# Patient Record
Sex: MALE | Race: WHITE | Employment: OTHER | URBAN - METROPOLITAN AREA
[De-identification: names, ages, dates, MRNs, and addresses within clinical notes are randomized per-mention and may not be internally consistent; named-entity substitution may affect disease eponyms.]

---

## 2017-02-17 ENCOUNTER — OFFICE VISIT (OUTPATIENT)
Dept: FAMILY MEDICINE CLINIC | Age: 82
End: 2017-02-17

## 2017-02-17 VITALS
DIASTOLIC BLOOD PRESSURE: 74 MMHG | RESPIRATION RATE: 20 BRPM | HEIGHT: 72 IN | HEART RATE: 104 BPM | WEIGHT: 163.8 LBS | TEMPERATURE: 98.3 F | BODY MASS INDEX: 22.19 KG/M2 | SYSTOLIC BLOOD PRESSURE: 124 MMHG

## 2017-02-17 DIAGNOSIS — J06.9 URI, ACUTE: Primary | ICD-10-CM

## 2017-02-17 PROCEDURE — G8427 DOCREV CUR MEDS BY ELIG CLIN: HCPCS | Performed by: FAMILY MEDICINE

## 2017-02-17 PROCEDURE — 4040F PNEUMOC VAC/ADMIN/RCVD: CPT | Performed by: FAMILY MEDICINE

## 2017-02-17 PROCEDURE — 1036F TOBACCO NON-USER: CPT | Performed by: FAMILY MEDICINE

## 2017-02-17 PROCEDURE — 1123F ACP DISCUSS/DSCN MKR DOCD: CPT | Performed by: FAMILY MEDICINE

## 2017-02-17 PROCEDURE — G8419 CALC BMI OUT NRM PARAM NOF/U: HCPCS | Performed by: FAMILY MEDICINE

## 2017-02-17 PROCEDURE — 99213 OFFICE O/P EST LOW 20 MIN: CPT | Performed by: FAMILY MEDICINE

## 2017-02-17 PROCEDURE — G8484 FLU IMMUNIZE NO ADMIN: HCPCS | Performed by: FAMILY MEDICINE

## 2017-02-17 RX ORDER — HYDROCODONE POLISTIREX AND CHLORPHENIRAMINE POLISTIREX 10; 8 MG/5ML; MG/5ML
5 SUSPENSION, EXTENDED RELEASE ORAL EVERY 12 HOURS PRN
Qty: 115 ML | Refills: 0 | Status: SHIPPED | OUTPATIENT
Start: 2017-02-17 | End: 2018-02-12 | Stop reason: ALTCHOICE

## 2017-02-17 ASSESSMENT — PATIENT HEALTH QUESTIONNAIRE - PHQ9
SUM OF ALL RESPONSES TO PHQ QUESTIONS 1-9: 0
2. FEELING DOWN, DEPRESSED OR HOPELESS: 0
SUM OF ALL RESPONSES TO PHQ9 QUESTIONS 1 & 2: 0
1. LITTLE INTEREST OR PLEASURE IN DOING THINGS: 0

## 2017-02-20 ASSESSMENT — ENCOUNTER SYMPTOMS
WHEEZING: 0
SHORTNESS OF BREATH: 0
COUGH: 1
SINUS PRESSURE: 1
GASTROINTESTINAL NEGATIVE: 1

## 2017-04-04 ENCOUNTER — TELEPHONE (OUTPATIENT)
Dept: FAMILY MEDICINE CLINIC | Age: 82
End: 2017-04-04

## 2017-04-06 ENCOUNTER — OFFICE VISIT (OUTPATIENT)
Dept: FAMILY MEDICINE CLINIC | Age: 82
End: 2017-04-06

## 2017-04-06 VITALS
HEART RATE: 84 BPM | SYSTOLIC BLOOD PRESSURE: 142 MMHG | HEIGHT: 72 IN | WEIGHT: 161.6 LBS | DIASTOLIC BLOOD PRESSURE: 80 MMHG | RESPIRATION RATE: 16 BRPM | BODY MASS INDEX: 21.89 KG/M2

## 2017-04-06 DIAGNOSIS — S01.111A LACERATION OF EYEBROW, RIGHT, INITIAL ENCOUNTER: Primary | ICD-10-CM

## 2017-04-06 DIAGNOSIS — S05.11XA EYE CONTUSION, RIGHT, INITIAL ENCOUNTER: ICD-10-CM

## 2017-04-06 PROCEDURE — 1036F TOBACCO NON-USER: CPT | Performed by: FAMILY MEDICINE

## 2017-04-06 PROCEDURE — 99213 OFFICE O/P EST LOW 20 MIN: CPT | Performed by: FAMILY MEDICINE

## 2017-04-06 PROCEDURE — 4040F PNEUMOC VAC/ADMIN/RCVD: CPT | Performed by: FAMILY MEDICINE

## 2017-04-06 PROCEDURE — G8427 DOCREV CUR MEDS BY ELIG CLIN: HCPCS | Performed by: FAMILY MEDICINE

## 2017-04-06 PROCEDURE — 1123F ACP DISCUSS/DSCN MKR DOCD: CPT | Performed by: FAMILY MEDICINE

## 2017-04-06 PROCEDURE — G8419 CALC BMI OUT NRM PARAM NOF/U: HCPCS | Performed by: FAMILY MEDICINE

## 2017-04-06 ASSESSMENT — ENCOUNTER SYMPTOMS
GASTROINTESTINAL NEGATIVE: 1
RESPIRATORY NEGATIVE: 1

## 2017-07-15 ENCOUNTER — HOSPITAL ENCOUNTER (OUTPATIENT)
Age: 82
Setting detail: SPECIMEN
Discharge: HOME | End: 2017-07-15
Payer: MEDICARE

## 2017-07-15 DIAGNOSIS — C61 PROSTATE CANCER (HCC): ICD-10-CM

## 2017-07-15 PROCEDURE — 36415 COLL VENOUS BLD VENIPUNCTURE: CPT

## 2017-07-15 PROCEDURE — 84153 ASSAY OF PSA TOTAL: CPT

## 2017-07-16 LAB — PROSTATE SPECIFIC ANTIGEN: < 0.02 NG/ML (ref 0–1)

## 2017-08-03 ENCOUNTER — HOSPITAL ENCOUNTER (OUTPATIENT)
Dept: RADIATION ONCOLOGY | Age: 82
Discharge: HOME | End: 2017-08-03
Payer: MEDICARE

## 2017-08-03 PROCEDURE — 99211 OFF/OP EST MAY X REQ PHY/QHP: CPT | Performed by: RADIOLOGY

## 2017-09-13 ENCOUNTER — OFFICE VISIT (OUTPATIENT)
Dept: FAMILY MEDICINE CLINIC | Age: 82
End: 2017-09-13
Payer: MEDICARE

## 2017-09-13 VITALS
WEIGHT: 155.6 LBS | DIASTOLIC BLOOD PRESSURE: 68 MMHG | BODY MASS INDEX: 23.05 KG/M2 | HEART RATE: 56 BPM | HEIGHT: 69 IN | SYSTOLIC BLOOD PRESSURE: 122 MMHG | OXYGEN SATURATION: 93 % | RESPIRATION RATE: 12 BRPM

## 2017-09-13 DIAGNOSIS — Z85.46 HISTORY OF PROSTATE CANCER: ICD-10-CM

## 2017-09-13 DIAGNOSIS — R73.01 IFG (IMPAIRED FASTING GLUCOSE): ICD-10-CM

## 2017-09-13 DIAGNOSIS — E78.5 HYPERLIPIDEMIA WITH TARGET LDL LESS THAN 100: ICD-10-CM

## 2017-09-13 DIAGNOSIS — Z23 NEED FOR INFLUENZA VACCINATION: ICD-10-CM

## 2017-09-13 DIAGNOSIS — I10 ESSENTIAL HYPERTENSION: Primary | ICD-10-CM

## 2017-09-13 DIAGNOSIS — Z90.79 S/P PROSTATECTOMY: ICD-10-CM

## 2017-09-13 PROCEDURE — G8427 DOCREV CUR MEDS BY ELIG CLIN: HCPCS | Performed by: FAMILY MEDICINE

## 2017-09-13 PROCEDURE — 90688 IIV4 VACCINE SPLT 0.5 ML IM: CPT | Performed by: FAMILY MEDICINE

## 2017-09-13 PROCEDURE — 1123F ACP DISCUSS/DSCN MKR DOCD: CPT | Performed by: FAMILY MEDICINE

## 2017-09-13 PROCEDURE — 4040F PNEUMOC VAC/ADMIN/RCVD: CPT | Performed by: FAMILY MEDICINE

## 2017-09-13 PROCEDURE — 1036F TOBACCO NON-USER: CPT | Performed by: FAMILY MEDICINE

## 2017-09-13 PROCEDURE — 99214 OFFICE O/P EST MOD 30 MIN: CPT | Performed by: FAMILY MEDICINE

## 2017-09-13 PROCEDURE — G0008 ADMIN INFLUENZA VIRUS VAC: HCPCS | Performed by: FAMILY MEDICINE

## 2017-09-13 PROCEDURE — G8420 CALC BMI NORM PARAMETERS: HCPCS | Performed by: FAMILY MEDICINE

## 2017-09-13 ASSESSMENT — ENCOUNTER SYMPTOMS
GASTROINTESTINAL NEGATIVE: 1
RESPIRATORY NEGATIVE: 1

## 2017-11-03 ENCOUNTER — HOSPITAL ENCOUNTER (OUTPATIENT)
Age: 82
Discharge: HOME | End: 2017-11-03
Payer: MEDICARE

## 2017-11-03 DIAGNOSIS — C61 PROSTATE CANCER (HCC): ICD-10-CM

## 2017-11-03 DIAGNOSIS — R73.01 IFG (IMPAIRED FASTING GLUCOSE): ICD-10-CM

## 2017-11-03 DIAGNOSIS — E78.5 HYPERLIPIDEMIA WITH TARGET LDL LESS THAN 100: ICD-10-CM

## 2017-11-03 DIAGNOSIS — I10 ESSENTIAL HYPERTENSION: ICD-10-CM

## 2017-11-03 LAB
ALBUMIN SERPL-MCNC: 4.3 G/DL (ref 3.5–5.1)
ALBUMIN SERPL-MCNC: 4.3 G/DL (ref 3.5–5.1)
ALP BLD-CCNC: 63 U/L (ref 38–126)
ALP BLD-CCNC: 63 U/L (ref 38–126)
ALT SERPL-CCNC: 13 U/L (ref 11–66)
ALT SERPL-CCNC: 14 U/L (ref 11–66)
ANION GAP SERPL CALCULATED.3IONS-SCNC: 8 MEQ/L (ref 8–16)
AST SERPL-CCNC: 14 U/L (ref 5–40)
AST SERPL-CCNC: 15 U/L (ref 5–40)
AVERAGE GLUCOSE: 102 MG/DL (ref 70–126)
BASOPHILS # BLD: 0.5 %
BASOPHILS ABSOLUTE: 0 THOU/MM3 (ref 0–0.1)
BILIRUB SERPL-MCNC: 0.6 MG/DL (ref 0.3–1.2)
BILIRUB SERPL-MCNC: 0.6 MG/DL (ref 0.3–1.2)
BILIRUBIN DIRECT: < 0.2 MG/DL (ref 0–0.3)
BUN BLDV-MCNC: 16 MG/DL (ref 7–22)
CALCIUM SERPL-MCNC: 9.1 MG/DL (ref 8.5–10.5)
CHLORIDE BLD-SCNC: 102 MEQ/L (ref 98–111)
CHOLESTEROL, TOTAL: 166 MG/DL (ref 100–199)
CO2: 29 MEQ/L (ref 23–33)
CREAT SERPL-MCNC: 0.6 MG/DL (ref 0.4–1.2)
EOSINOPHIL # BLD: 6.5 %
EOSINOPHILS ABSOLUTE: 0.5 THOU/MM3 (ref 0–0.4)
GFR SERPL CREATININE-BSD FRML MDRD: > 90 ML/MIN/1.73M2
GLUCOSE BLD-MCNC: 100 MG/DL (ref 70–108)
HBA1C MFR BLD: 5.4 % (ref 4.4–6.4)
HCT VFR BLD CALC: 45.7 % (ref 42–52)
HDLC SERPL-MCNC: 55 MG/DL
HEMOGLOBIN: 15.4 GM/DL (ref 14–18)
LDL CHOLESTEROL CALCULATED: 92 MG/DL
LYMPHOCYTES # BLD: 14 %
LYMPHOCYTES ABSOLUTE: 1 THOU/MM3 (ref 1–4.8)
MCH RBC QN AUTO: 30 PG (ref 27–31)
MCHC RBC AUTO-ENTMCNC: 33.7 GM/DL (ref 33–37)
MCV RBC AUTO: 88.8 FL (ref 80–94)
MONOCYTES # BLD: 10.1 %
MONOCYTES ABSOLUTE: 0.7 THOU/MM3 (ref 0.4–1.3)
NUCLEATED RED BLOOD CELLS: 0 /100 WBC
PDW BLD-RTO: 13.7 % (ref 11.5–14.5)
PLATELET # BLD: 182 THOU/MM3 (ref 130–400)
PMV BLD AUTO: 9.1 MCM (ref 7.4–10.4)
POTASSIUM SERPL-SCNC: 4.7 MEQ/L (ref 3.5–5.2)
PROSTATE SPECIFIC ANTIGEN: < 0.02 NG/ML (ref 0–1)
RBC # BLD: 5.14 MILL/MM3 (ref 4.7–6.1)
SEG NEUTROPHILS: 68.9 %
SEGMENTED NEUTROPHILS ABSOLUTE COUNT: 4.9 THOU/MM3 (ref 1.8–7.7)
SODIUM BLD-SCNC: 139 MEQ/L (ref 135–145)
TOTAL PROTEIN: 6.7 G/DL (ref 6.1–8)
TOTAL PROTEIN: 6.8 G/DL (ref 6.1–8)
TRIGL SERPL-MCNC: 95 MG/DL (ref 0–199)
WBC # BLD: 7.1 THOU/MM3 (ref 4.8–10.8)

## 2017-11-03 PROCEDURE — 85025 COMPLETE CBC W/AUTO DIFF WBC: CPT

## 2017-11-03 PROCEDURE — 80061 LIPID PANEL: CPT

## 2017-11-03 PROCEDURE — 84153 ASSAY OF PSA TOTAL: CPT

## 2017-11-03 PROCEDURE — 83036 HEMOGLOBIN GLYCOSYLATED A1C: CPT

## 2017-11-03 PROCEDURE — 36415 COLL VENOUS BLD VENIPUNCTURE: CPT

## 2017-11-03 PROCEDURE — 80053 COMPREHEN METABOLIC PANEL: CPT

## 2017-11-03 PROCEDURE — 80076 HEPATIC FUNCTION PANEL: CPT

## 2017-11-06 ENCOUNTER — OFFICE VISIT (OUTPATIENT)
Dept: UROLOGY | Age: 82
End: 2017-11-06
Payer: MEDICARE

## 2017-11-06 VITALS
DIASTOLIC BLOOD PRESSURE: 80 MMHG | BODY MASS INDEX: 22.48 KG/M2 | HEIGHT: 71 IN | SYSTOLIC BLOOD PRESSURE: 130 MMHG | WEIGHT: 160.6 LBS

## 2017-11-06 DIAGNOSIS — C61 PROSTATE CANCER (HCC): Primary | ICD-10-CM

## 2017-11-06 LAB
BILIRUBIN URINE: NEGATIVE
BLOOD URINE, POC: NORMAL
CHARACTER, URINE: CLEAR
COLOR, URINE: YELLOW
GLUCOSE URINE: NEGATIVE MG/DL
KETONES, URINE: NEGATIVE
LEUKOCYTE CLUMPS, URINE: NEGATIVE
NITRITE, URINE: NEGATIVE
PH, URINE: 6.5
PROTEIN, URINE: NEGATIVE MG/DL
SPECIFIC GRAVITY, URINE: 1.01 (ref 1–1.03)
UROBILINOGEN, URINE: 0.2 EU/DL

## 2017-11-06 PROCEDURE — 4040F PNEUMOC VAC/ADMIN/RCVD: CPT | Performed by: UROLOGY

## 2017-11-06 PROCEDURE — 81003 URINALYSIS AUTO W/O SCOPE: CPT | Performed by: UROLOGY

## 2017-11-06 PROCEDURE — G8428 CUR MEDS NOT DOCUMENT: HCPCS | Performed by: UROLOGY

## 2017-11-06 PROCEDURE — 1123F ACP DISCUSS/DSCN MKR DOCD: CPT | Performed by: UROLOGY

## 2017-11-06 PROCEDURE — G8420 CALC BMI NORM PARAMETERS: HCPCS | Performed by: UROLOGY

## 2017-11-06 PROCEDURE — G8484 FLU IMMUNIZE NO ADMIN: HCPCS | Performed by: UROLOGY

## 2017-11-06 PROCEDURE — 1036F TOBACCO NON-USER: CPT | Performed by: UROLOGY

## 2017-11-06 PROCEDURE — 99214 OFFICE O/P EST MOD 30 MIN: CPT | Performed by: UROLOGY

## 2017-11-06 ASSESSMENT — ENCOUNTER SYMPTOMS
COLOR CHANGE: 0
NAUSEA: 0
ABDOMINAL PAIN: 0
FACIAL SWELLING: 0
EYE PAIN: 0
CHEST TIGHTNESS: 0
BACK PAIN: 0
EYE REDNESS: 0
SHORTNESS OF BREATH: 0

## 2017-11-06 NOTE — PROGRESS NOTES
m)   Wt 160 lb 9.6 oz (72.8 kg)   BMI 22.40 kg/m²     Objective:   Physical Exam   Constitutional: He is oriented to person, place, and time. Vital signs are normal. He appears well-developed and well-nourished. He is cooperative. No distress. HENT:   Head: Normocephalic and atraumatic. Mouth/Throat: Oropharynx is clear and moist and mucous membranes are normal. No oropharyngeal exudate. Eyes: EOM are normal. Pupils are equal, round, and reactive to light. Right eye exhibits no discharge. Left eye exhibits no discharge. No scleral icterus. Neck: Trachea normal. No JVD present. No tracheal deviation present. Pulmonary/Chest: Effort normal. No respiratory distress. He has no wheezes. Abdominal: Soft. He exhibits no distension. There is no tenderness. There is no rebound and no CVA tenderness. Musculoskeletal: He exhibits no edema or tenderness. Lymphadenopathy:        Right: No supraclavicular adenopathy present. Left: No supraclavicular adenopathy present. Neurological: He is alert and oriented to person, place, and time. No cranial nerve deficit. Skin: Skin is warm and dry. He is not diaphoretic. Psychiatric: He has a normal mood and affect. His behavior is normal.   Nursing note and vitals reviewed.       Labs    Results for POC orders placed in visit on 11/06/17   POCT Urinalysis No Micro (Auto)   Result Value Ref Range    Glucose, Ur Negative NEGATIVE mg/dl    Bilirubin Urine Negative     Ketones, Urine Negative NEGATIVE    Specific Gravity, Urine 1.010 1.002 - 1.03    Blood, UA POC Trace-intact NEGATIVE    pH, Urine 6.50 5.0 - 9.0    Protein, Urine Negative NEGATIVE mg/dl    Urobilinogen, Urine 0.20 0.0 - 1.0 eu/dl    Nitrite, Urine Negative NEGATIVE    Leukocyte Clumps, Urine Negative NEGATIVE    Color, Urine Yellow YELLOW-STR    Character, Urine Clear [id number]       Lab Results   Component Value Date    CREATININE 0.6 11/03/2017    BUN 16 11/03/2017     11/03/2017    K 4.7 11/03/2017     11/03/2017    CO2 29 11/03/2017       Lab Results   Component Value Date    PSA <0.02 11/03/2017    PSA <0.02 07/15/2017    PSA <0.02 01/19/2017           Assessment:      1. Prostate cancer ([facility]  POCT Urinalysis No Micro (Auto)       Mr. Daksha Dunham presents today in follow-up for Prostate cancer ([facility] Jemal Angelo. PSA remains undetectable at 0.02. He will continue getting this drawn every 6 months. He has no complaints with urination. Overall he is doing very well. He is very pleased with his progress. I have reviewed all notes sent along with this referral including notes from a recent visit to his primary care provider. These records demonstrated the following past medical history:  Past Medical History:   Diagnosis Date    Hyperlipidemia     Hypertension     Prostate cancer ([facility]     38 rounds of radiation for prostate Ca          Plan:        Return in a year -PSA Prior  Standing order for PSA given for every 6 months.

## 2018-01-11 ENCOUNTER — HOSPITAL ENCOUNTER (OUTPATIENT)
Age: 83
Discharge: HOME | End: 2018-01-11
Payer: MEDICARE

## 2018-01-11 DIAGNOSIS — C61 PROSTATE CANCER (HCC): ICD-10-CM

## 2018-01-11 LAB — PROSTATE SPECIFIC ANTIGEN: < 0.02 NG/ML (ref 0–1)

## 2018-01-11 PROCEDURE — 36415 COLL VENOUS BLD VENIPUNCTURE: CPT

## 2018-01-11 PROCEDURE — 84153 ASSAY OF PSA TOTAL: CPT

## 2018-01-17 ENCOUNTER — TELEPHONE (OUTPATIENT)
Dept: FAMILY MEDICINE CLINIC | Age: 83
End: 2018-01-17

## 2018-01-17 DIAGNOSIS — E78.5 HYPERLIPIDEMIA WITH TARGET LDL LESS THAN 100: ICD-10-CM

## 2018-01-17 DIAGNOSIS — I10 ESSENTIAL HYPERTENSION: ICD-10-CM

## 2018-01-17 RX ORDER — AMLODIPINE BESYLATE 5 MG/1
5 TABLET ORAL DAILY
Qty: 30 TABLET | Refills: 0 | Status: SHIPPED | OUTPATIENT
Start: 2018-01-17 | End: 2018-03-10 | Stop reason: SDUPTHER

## 2018-01-17 RX ORDER — AMLODIPINE BESYLATE 5 MG/1
5 TABLET ORAL DAILY
Qty: 90 TABLET | Refills: 3 | Status: SHIPPED | OUTPATIENT
Start: 2018-01-17 | End: 2018-01-17 | Stop reason: SDUPTHER

## 2018-01-17 RX ORDER — BENAZEPRIL HYDROCHLORIDE 10 MG/1
10 TABLET ORAL DAILY
Qty: 30 TABLET | Refills: 0 | Status: SHIPPED | OUTPATIENT
Start: 2018-01-17 | End: 2018-03-10 | Stop reason: SDUPTHER

## 2018-01-17 RX ORDER — VALSARTAN 320 MG/1
320 TABLET ORAL DAILY
Qty: 90 TABLET | Refills: 3 | Status: SHIPPED | OUTPATIENT
Start: 2018-01-17 | End: 2018-01-17 | Stop reason: SDUPTHER

## 2018-01-17 RX ORDER — SIMVASTATIN 20 MG
TABLET ORAL
Qty: 30 TABLET | Refills: 0 | Status: SHIPPED | OUTPATIENT
Start: 2018-01-17 | End: 2018-03-10 | Stop reason: SDUPTHER

## 2018-01-17 RX ORDER — SIMVASTATIN 20 MG
TABLET ORAL
Qty: 90 TABLET | Refills: 3 | Status: SHIPPED | OUTPATIENT
Start: 2018-01-17 | End: 2018-01-17 | Stop reason: SDUPTHER

## 2018-01-17 RX ORDER — BENAZEPRIL HYDROCHLORIDE 10 MG/1
10 TABLET ORAL DAILY
Qty: 90 TABLET | Refills: 3 | Status: SHIPPED | OUTPATIENT
Start: 2018-01-17 | End: 2018-01-17 | Stop reason: SDUPTHER

## 2018-01-17 RX ORDER — VALSARTAN 320 MG/1
320 TABLET ORAL DAILY
Qty: 30 TABLET | Refills: 0 | Status: SHIPPED | OUTPATIENT
Start: 2018-01-17 | End: 2018-03-10 | Stop reason: SDUPTHER

## 2018-02-09 ENCOUNTER — HOSPITAL ENCOUNTER (OUTPATIENT)
Dept: RADIATION ONCOLOGY | Age: 83
Discharge: HOME | End: 2018-02-09
Payer: MEDICARE

## 2018-02-09 PROCEDURE — 99211 OFF/OP EST MAY X REQ PHY/QHP: CPT | Performed by: RADIOLOGY

## 2018-02-09 NOTE — ONCOLOGY
wheeze, rhonchi or rales   Heart: Regular rate and rhythm,   Abdomen: Active bowel sounds present, soft, non-tender;  no mass,  no organomegaly  /Rectal: External genitalia without abnormality, lesion or discharge. Patient is continent of urine at this time. There is no external hemorrhoidal tissue present. Digitally sphincter tone is normal prostatic fossa is \"empty\" remaining slightly concave without areas of suspicious induration or nodularity. Within the limits of this exam I do not appreciate an intrinsic rectal mass  Extremities: without cyanosis, clubbing, edema or asymmetry  Skin: No fresh rash, purpura or petechiae    LABS:     CBC:   Lab Results   Component Value Date    WBC 7.1 11/03/2017    HGB 15.4 11/03/2017    MCV 88.8 11/03/2017     11/03/2017       BMP:   Lab Results   Component Value Date     11/03/2017    K 4.7 11/03/2017    CO2 29 11/03/2017    BUN 16 11/03/2017    CREATININE 0.6 11/03/2017       HEPATIC:  Lab Results   Component Value Date    AST 15 11/03/2017    AST 14 11/03/2017    ALT 13 11/03/2017    ALT 14 11/03/2017    ALKPHOS 63 11/03/2017    ALKPHOS 63 11/03/2017    PROT 6.7 11/03/2017    PROT 6.8 11/03/2017    BILITOT 0.6 11/03/2017    BILITOT 0.6 11/03/2017    BILIDIR <0.2 11/03/2017       TUMOR MARKERS:   Lab Results   Component Value Date    PSA <0.02 01/11/2018       IMAGING:     No recent relevant studies for review. He fell on the ice recently and had x-rays but had no dislocations or fractures. ASSESSMENT AND PLAN:       Issa Rea is cNED without significant  treatment related sequela. They will be seen in a routine follow-up interval of 6  months. Issa Rea understands that they may and should make an earlier appointment should the need arise. Thank you for the opportunity to remain involved with sharing in [facility] ([id number]):  In excess of 25 minutes was spent with the patient discussing their medical history, treatment outcome and possible treatment-related side effects. Greater than 50% of this time was spent in face to face discussion of current disease status and ongoing management as it pertains to their individual survivorship plan. Signature:      Zaida Acosta. Paolo Nugent MD  Radiation Oncology  Signed electronically without final edit.

## 2018-02-12 ENCOUNTER — OFFICE VISIT (OUTPATIENT)
Dept: FAMILY MEDICINE CLINIC | Age: 83
End: 2018-02-12
Payer: MEDICARE

## 2018-02-12 VITALS
HEIGHT: 68 IN | SYSTOLIC BLOOD PRESSURE: 120 MMHG | BODY MASS INDEX: 24.16 KG/M2 | HEART RATE: 72 BPM | DIASTOLIC BLOOD PRESSURE: 72 MMHG | WEIGHT: 159.4 LBS | OXYGEN SATURATION: 97 %

## 2018-02-12 DIAGNOSIS — Z90.79 S/P PROSTATECTOMY: ICD-10-CM

## 2018-02-12 DIAGNOSIS — Z23 NEED FOR 23-POLYVALENT PNEUMOCOCCAL POLYSACCHARIDE VACCINE: ICD-10-CM

## 2018-02-12 DIAGNOSIS — I10 ESSENTIAL HYPERTENSION: Primary | ICD-10-CM

## 2018-02-12 DIAGNOSIS — Z00.00 ROUTINE GENERAL MEDICAL EXAMINATION AT A HEALTH CARE FACILITY: ICD-10-CM

## 2018-02-12 DIAGNOSIS — Z85.46 HISTORY OF PROSTATE CANCER: ICD-10-CM

## 2018-02-12 DIAGNOSIS — E78.5 HYPERLIPIDEMIA WITH TARGET LDL LESS THAN 100: ICD-10-CM

## 2018-02-12 PROCEDURE — 1036F TOBACCO NON-USER: CPT | Performed by: FAMILY MEDICINE

## 2018-02-12 PROCEDURE — G0009 ADMIN PNEUMOCOCCAL VACCINE: HCPCS | Performed by: FAMILY MEDICINE

## 2018-02-12 PROCEDURE — G8484 FLU IMMUNIZE NO ADMIN: HCPCS | Performed by: FAMILY MEDICINE

## 2018-02-12 PROCEDURE — 90732 PPSV23 VACC 2 YRS+ SUBQ/IM: CPT | Performed by: FAMILY MEDICINE

## 2018-02-12 PROCEDURE — 4040F PNEUMOC VAC/ADMIN/RCVD: CPT | Performed by: FAMILY MEDICINE

## 2018-02-12 PROCEDURE — 99214 OFFICE O/P EST MOD 30 MIN: CPT | Performed by: FAMILY MEDICINE

## 2018-02-12 PROCEDURE — 1123F ACP DISCUSS/DSCN MKR DOCD: CPT | Performed by: FAMILY MEDICINE

## 2018-02-12 PROCEDURE — G0439 PPPS, SUBSEQ VISIT: HCPCS | Performed by: FAMILY MEDICINE

## 2018-02-12 PROCEDURE — G8420 CALC BMI NORM PARAMETERS: HCPCS | Performed by: FAMILY MEDICINE

## 2018-02-12 PROCEDURE — G8427 DOCREV CUR MEDS BY ELIG CLIN: HCPCS | Performed by: FAMILY MEDICINE

## 2018-02-12 ASSESSMENT — LIFESTYLE VARIABLES: HOW OFTEN DO YOU HAVE A DRINK CONTAINING ALCOHOL: 0

## 2018-02-12 ASSESSMENT — ENCOUNTER SYMPTOMS
GASTROINTESTINAL NEGATIVE: 1
RESPIRATORY NEGATIVE: 1

## 2018-02-12 ASSESSMENT — ANXIETY QUESTIONNAIRES: GAD7 TOTAL SCORE: 0

## 2018-02-12 ASSESSMENT — PATIENT HEALTH QUESTIONNAIRE - PHQ9: SUM OF ALL RESPONSES TO PHQ QUESTIONS 1-9: 0

## 2018-02-12 NOTE — PROGRESS NOTES
Patient was given Pneumovax 23 IM in left deltoid per v.o. Dr Luz Diaz, given by Jeri Holman CMA ([facility]). [address] #[id number]. Patient tolerated well and without incident.

## 2018-02-12 NOTE — PROGRESS NOTES
Visit Information    Have you changed or started any medications since your last visit including any over-the-counter medicines, vitamins, or herbal medicines? no   Are you having any side effects from any of your medications? -  no  Have you stopped taking any of your medications? Is so, why? -  no    Have you seen any other physician or provider since your last visit? Yes - Records Obtained  Have you had any other diagnostic tests since your last visit? Yes - Records Obtained  Have you been seen in the emergency room and/or had an admission to a hospital since we last saw you? No  Have you had your routine dental cleaning in the past 6 months? yes -     Have you activated your [employer] account? If not, what are your barriers?  No:      Patient Care Team:  Juan M Garcia DO as PCP - General (Family Medicine)  Juan M Garcia DO as PCP - S Attributed Provider  Celio Lara MD as Consulting Physician (Urology)    Medical History Review  Past Medical, Family, and Social History reviewed and does contribute to the patient presenting condition    Health Maintenance   Topic Date Due    Pneumococcal low/med risk (2 of 2 - PPSV23) 09/11/2016    DTaP/Tdap/Td vaccine (1 - Tdap) 03/30/2017    Potassium monitoring  11/03/2018    Creatinine monitoring  11/03/2018    Zostavax vaccine  Addressed    Flu vaccine  Completed

## 2018-02-12 NOTE — PROGRESS NOTES
Subjective:      Patient ID: Georgi Contreras is a 80 y.o. male. HPI:   Annual eval.    Doing well overall. Continues to follow with Dr. Angus Arenas on regular basis. Denies CP, SOB. BP well controlled. BP Readings from Last 3 Encounters:   02/12/18 120/72   11/06/17 130/80   09/13/17 122/68     Weight stable. Wt Readings from Last 3 Encounters:   02/12/18 159 lb 6.4 oz (72.3 kg)   11/06/17 160 lb 9.6 oz (72.8 kg)   09/13/17 155 lb 9.6 oz (70.6 kg)     Hx of prostate cancer, follows closely with Urology. No acute complaints today. Patient Active Problem List   Diagnosis    S/P colonoscopy    HTN (hypertension)    Hyperlipidemia with target LDL less than 100    History of prostate cancer    Elevated PSA    S/P prostatectomy     Past Surgical History:   Procedure Laterality Date    COLONOSCOPY  9-19-13    Dr. Adina Calixto Right 1990's     INGUINAL HERNIA REPAIR Left 1990's    [facility] Right 1's    [facility] in [place]' prostatectomy     Prior to Admission medications    Medication Sig Start Date End Date Taking? Authorizing Provider   simvastatin (ZOCOR) 20 MG tablet Take 1 tablet by mouth  nightly 1/17/18  Yes Sharon Maya, DO   amLODIPine (NORVASC) 5 MG tablet Take 1 tablet by mouth daily 1/17/18  Yes Sharon Maya, DO   benazepril (LOTENSIN) 10 MG tablet Take 1 tablet by mouth daily 1/17/18  Yes Sharon Maya DO   valsartan (DIOVAN) 320 MG tablet Take 1 tablet by mouth daily 1/17/18  Yes Sharon Maya, DO   ipratropium (ATROVENT) 0.03 % nasal spray 2 sprays by Nasal route every 12 hours 11/29/16  Yes Sharon Maya, DO   aspirin 81 MG tablet Take 81 mg by mouth daily. Yes Historical Provider, MD   Multiple Vitamins-Minerals (MULTIVITAMIN PO) Take 1 tablet by mouth daily. Yes Historical Provider, MD   b complex vitamins capsule Take 1 capsule by mouth daily.    Yes Historical Provider, MD   Ascorbic Acid (VITAMIN C) 500 MG tablet Take 1,000 mg by mouth daily. Yes Historical Provider, MD   oyster calcium (OYST-VEGA) 500 MG TABS Take 1 tablet by mouth daily. Yes Historical Provider, MD   Omega 3 1000 MG CAPS Take 1 tablet by mouth daily. Yes Historical Provider, MD   Vitamin D (CHOLECALCIFEROL) 1000 UNITS CAPS capsule Take 1,000 Units by mouth daily.    Yes Historical Provider, MD       Lab Results   Component Value Date    LABA1C 5.4 11/03/2017     No results found for: EAG    No components found for: CHLPL  Lab Results   Component Value Date    TRIG 95 11/03/2017    TRIG 83 06/03/2016    TRIG 104 03/03/2015     Lab Results   Component Value Date    HDL 55 11/03/2017    HDL 53 06/03/2016    HDL 53 03/03/2015     Lab Results   Component Value Date    LDLCALC 92 11/03/2017    LDLCALC 74 06/03/2016     No results found for: LABVLDL      Chemistry        Component Value Date/Time     11/03/2017 0732    K 4.7 11/03/2017 0732     11/03/2017 0732    CO2 29 11/03/2017 0732    BUN 16 11/03/2017 0732    CREATININE 0.6 11/03/2017 0732        Component Value Date/Time    CALCIUM 9.1 11/03/2017 0732    ALKPHOS 63 11/03/2017 0732    ALKPHOS 63 11/03/2017 0732    AST 15 11/03/2017 0732    AST 14 11/03/2017 0732    ALT 13 11/03/2017 0732    ALT 14 11/03/2017 0732    BILITOT 0.6 11/03/2017 0732    BILITOT 0.6 11/03/2017 0732            No results found for: TSH, Q7PXSZS, B5WMOWH, THYROIDAB    Lab Results   Component Value Date    WBC 7.1 11/03/2017    HGB 15.4 11/03/2017    HCT 45.7 11/03/2017    MCV 88.8 11/03/2017     11/03/2017         Health Maintenance   Topic Date Due    Pneumococcal low/med risk (2 of 2 - PPSV23) 09/11/2016    DTaP/Tdap/Td vaccine (1 - Tdap) 03/30/2017    Potassium monitoring  11/03/2018    Creatinine monitoring  11/03/2018    Zostavax vaccine  Addressed    Flu vaccine  Completed       Immunization History   Administered Date(s) Administered    Influenza Vaccine, unspecified formulation 11/23/2015    Influenza, Quadv, 3 Years and older, IM 11/29/2016, 09/13/2017    Pneumococcal 13-valent Conjugate ([id number]) 09/11/2015    Td 03/29/2017         Review of Systems   Constitutional: Negative. HENT: Negative. Respiratory: Negative. Cardiovascular: Negative. Gastrointestinal: Negative. Musculoskeletal: Negative. All other systems reviewed and are negative. Objective:   Physical Exam   Constitutional: He is oriented to person, place, and time. He appears well-developed and well-nourished. HENT:   Head: Normocephalic and atraumatic. Right Ear: Tympanic membrane normal.   Left Ear: Tympanic membrane normal.   Mouth/Throat: Oropharynx is clear and moist and mucous membranes are normal.   Cardiovascular: Normal rate, regular rhythm and normal heart sounds. No murmur heard. Pulmonary/Chest: Effort normal and breath sounds normal.   Abdominal: Soft. Bowel sounds are normal.   Musculoskeletal: He exhibits no edema. Neurological: He is alert and oriented to person, place, and time. Skin: Skin is warm and dry. Psychiatric: He has a normal mood and affect. His behavior is normal.   Nursing note and vitals reviewed. Assessment:      1. Essential hypertension     2. Hyperlipidemia with target LDL less than 100     3. History of prostate cancer     4. S/P prostatectomy     5. Need for 23-polyvalent pneumococcal polysaccharide vaccine  Pneumococcal polysaccharide vaccine 23-valent greater than or equal to 3yo subcutaneous/IM   6. Routine general medical examination at a health care facility             Plan:      -  Chronic medical problems stable  -  Continue current medications  -  Follow up with specialists as scheduled  -  Labs reviewed, look fine  -  Pneumovax-23 today  -  RTO annually          Medicare Annual Wellness Visit  Name: Jl Velazquez Date: 2/12/2018   MRN: [id number] Sex: Male   Age: 80 y.o.  Ethnicity: Date    COLONOSCOPY  9-19-13    Dr. Andria Rivera Right 1990's     INGUINAL HERNIA REPAIR Left 1's     INGUINAL HERNIA REPAIR Right 1's    [address] in [place]' prostatectomy       Family History   Problem Relation Age of Onset    Stroke Mother     Arthritis Mother     Heart Attack Father     Heart Disease Father        CareTeam (Including outside providers/suppliers regularly involved in providing care):   Patient Care Team:  Isabella Paiz DO as PCP - General (Family Medicine)  Isabella Paiz DO as PCP - [facility] Attributed Provider  Jeremy Gomes MD as Consulting Physician (Urology)    Wt Readings from Last 3 Encounters:   02/12/18 159 lb 6.4 oz (72.3 kg)   11/06/17 160 lb 9.6 oz (72.8 kg)   09/13/17 155 lb 9.6 oz (70.6 kg)     Vitals:    02/12/18 0756   BP: 120/72   Site: Left Arm   Position: Sitting   Cuff Size: Small Adult   Pulse: 72   SpO2: 97%   Weight: 159 lb 6.4 oz (72.3 kg)   Height: 5' 8\" (1.727 m)           Patient's complete Health Risk Assessment and screening values have been reviewed and are found in Flowsheets. The following problems were reviewed today and where indicated follow up appointments were made and/or referrals ordered.     Positive Risk Factor Screenings with Interventions:     Safety:  Safety  Do you have working smoke detectors?: Yes  Have all throw rugs been removed or fastened?: (!) No  Do you have non-slip mats in all bathtubs?: Yes  Do all of your stairways have a railing or banister?: Yes  Are your doorways, halls and stairs free of clutter?: Yes  Do you always fasten your seatbelt when you are in a car?: Yes  Safety Interventions:  · Home safety tips provided      Personalized Preventive Plan   Current Health Maintenance Status  Immunization History   Administered Date(s) Administered    Influenza Vaccine, unspecified formulation 11/23/2015    Influenza, Quadv, 3 Years and older, IM 11/29/2016, 09/13/2017    Pneumococcal 13-valent Conjugate ([id number]) 09/11/2015    Td 03/29/2017        Health Maintenance   Topic Date Due    Pneumococcal low/med risk (2 of 2 - PPSV23) 09/11/2016    DTaP/Tdap/Td vaccine (1 - Tdap) 03/30/2017    Potassium monitoring  11/03/2018    Creatinine monitoring  11/03/2018    Zostavax vaccine  Addressed    Flu vaccine  Completed     Recommendations for Preventive Services Due: see orders.   Recommended screening schedule for the next 5-10 years is provided to the patient in written form: see Patient Instructions/AVS.

## 2018-03-10 DIAGNOSIS — I10 ESSENTIAL HYPERTENSION: ICD-10-CM

## 2018-03-10 DIAGNOSIS — E78.5 HYPERLIPIDEMIA WITH TARGET LDL LESS THAN 100: ICD-10-CM

## 2018-03-12 RX ORDER — AMLODIPINE BESYLATE 5 MG/1
5 TABLET ORAL DAILY
Qty: 30 TABLET | Refills: 0 | Status: SHIPPED | OUTPATIENT
Start: 2018-03-12 | End: 2018-06-26 | Stop reason: SDUPTHER

## 2018-03-12 RX ORDER — VALSARTAN 320 MG/1
320 TABLET ORAL DAILY
Qty: 30 TABLET | Refills: 0 | Status: SHIPPED | OUTPATIENT
Start: 2018-03-12 | End: 2018-06-26 | Stop reason: SDUPTHER

## 2018-03-12 RX ORDER — BENAZEPRIL HYDROCHLORIDE 10 MG/1
10 TABLET ORAL DAILY
Qty: 30 TABLET | Refills: 0 | Status: SHIPPED | OUTPATIENT
Start: 2018-03-12 | End: 2018-06-26 | Stop reason: SDUPTHER

## 2018-03-12 RX ORDER — SIMVASTATIN 20 MG
TABLET ORAL
Qty: 30 TABLET | Refills: 0 | Status: SHIPPED | OUTPATIENT
Start: 2018-03-12 | End: 2018-06-26 | Stop reason: SDUPTHER

## 2018-05-02 ENCOUNTER — HOSPITAL ENCOUNTER (OUTPATIENT)
Age: 83
Discharge: HOME | End: 2018-05-02
Payer: MEDICARE

## 2018-05-02 DIAGNOSIS — C61 PROSTATE CANCER (HCC): ICD-10-CM

## 2018-05-02 LAB — PROSTATE SPECIFIC ANTIGEN: < 0.02 NG/ML (ref 0–1)

## 2018-05-02 PROCEDURE — 36415 COLL VENOUS BLD VENIPUNCTURE: CPT

## 2018-05-02 PROCEDURE — 84153 ASSAY OF PSA TOTAL: CPT

## 2018-06-26 DIAGNOSIS — E78.5 HYPERLIPIDEMIA WITH TARGET LDL LESS THAN 100: ICD-10-CM

## 2018-06-26 DIAGNOSIS — I10 ESSENTIAL HYPERTENSION: ICD-10-CM

## 2018-06-27 ENCOUNTER — TELEPHONE (OUTPATIENT)
Dept: FAMILY MEDICINE CLINIC | Age: 83
End: 2018-06-27

## 2018-06-27 DIAGNOSIS — E78.5 HYPERLIPIDEMIA WITH TARGET LDL LESS THAN 100: ICD-10-CM

## 2018-06-27 DIAGNOSIS — I10 ESSENTIAL HYPERTENSION: ICD-10-CM

## 2018-06-27 RX ORDER — BENAZEPRIL HYDROCHLORIDE 10 MG/1
10 TABLET ORAL DAILY
Qty: 60 TABLET | Refills: 0 | Status: SHIPPED | OUTPATIENT
Start: 2018-06-27 | End: 2018-06-27 | Stop reason: SDUPTHER

## 2018-06-27 RX ORDER — VALSARTAN 320 MG/1
320 TABLET ORAL DAILY
Qty: 30 TABLET | Refills: 0 | Status: SHIPPED | OUTPATIENT
Start: 2018-06-27 | End: 2018-06-27 | Stop reason: SDUPTHER

## 2018-06-27 RX ORDER — VALSARTAN 320 MG/1
320 TABLET ORAL DAILY
Qty: 60 TABLET | Refills: 0 | Status: SHIPPED | OUTPATIENT
Start: 2018-06-27 | End: 2018-07-25

## 2018-06-27 RX ORDER — AMLODIPINE BESYLATE 5 MG/1
5 TABLET ORAL DAILY
Qty: 30 TABLET | Refills: 0 | Status: SHIPPED | OUTPATIENT
Start: 2018-06-27 | End: 2018-06-27 | Stop reason: SDUPTHER

## 2018-06-27 RX ORDER — AMLODIPINE BESYLATE 5 MG/1
5 TABLET ORAL DAILY
Qty: 60 TABLET | Refills: 0 | Status: SHIPPED | OUTPATIENT
Start: 2018-06-27 | End: 2018-06-27 | Stop reason: SDUPTHER

## 2018-06-27 RX ORDER — VALSARTAN 320 MG/1
320 TABLET ORAL DAILY
Qty: 60 TABLET | Refills: 0 | Status: SHIPPED | OUTPATIENT
Start: 2018-06-27 | End: 2018-06-27 | Stop reason: SDUPTHER

## 2018-06-27 RX ORDER — SIMVASTATIN 20 MG
TABLET ORAL
Qty: 60 TABLET | Refills: 0 | Status: SHIPPED | OUTPATIENT
Start: 2018-06-27 | End: 2018-06-27 | Stop reason: SDUPTHER

## 2018-06-27 RX ORDER — BENAZEPRIL HYDROCHLORIDE 10 MG/1
10 TABLET ORAL DAILY
Qty: 60 TABLET | Refills: 0 | Status: SHIPPED | OUTPATIENT
Start: 2018-06-27 | End: 2018-09-13 | Stop reason: SDUPTHER

## 2018-06-27 RX ORDER — AMLODIPINE BESYLATE 5 MG/1
5 TABLET ORAL DAILY
Qty: 60 TABLET | Refills: 0 | Status: SHIPPED | OUTPATIENT
Start: 2018-06-27 | End: 2018-09-13 | Stop reason: SDUPTHER

## 2018-06-27 RX ORDER — SIMVASTATIN 20 MG
TABLET ORAL
Qty: 30 TABLET | Refills: 0 | Status: SHIPPED | OUTPATIENT
Start: 2018-06-27 | End: 2018-06-27 | Stop reason: SDUPTHER

## 2018-06-27 RX ORDER — SIMVASTATIN 20 MG
TABLET ORAL
Qty: 60 TABLET | Refills: 0 | Status: SHIPPED | OUTPATIENT
Start: 2018-06-27 | End: 2018-09-13 | Stop reason: SDUPTHER

## 2018-06-27 RX ORDER — BENAZEPRIL HYDROCHLORIDE 10 MG/1
10 TABLET ORAL DAILY
Qty: 30 TABLET | Refills: 0 | Status: SHIPPED | OUTPATIENT
Start: 2018-06-27 | End: 2018-06-27 | Stop reason: SDUPTHER

## 2018-07-25 ENCOUNTER — TELEPHONE (OUTPATIENT)
Dept: FAMILY MEDICINE CLINIC | Age: 83
End: 2018-07-25

## 2018-07-25 RX ORDER — IRBESARTAN 300 MG/1
300 TABLET ORAL DAILY
Qty: 90 TABLET | Refills: 3 | Status: SHIPPED | OUTPATIENT
Start: 2018-07-25 | End: 2018-07-30 | Stop reason: SDUPTHER

## 2018-07-25 NOTE — TELEPHONE ENCOUNTER
Patient called and stated his medication Valsartan 320 mg has been recalled. Patient is requesting something different called into [facility].  Please call patient

## 2018-07-26 NOTE — TELEPHONE ENCOUNTER
Noted. Call pt back, no need for appt.   I have no idea where his meds are coming from, will have to discuss this with the Pharmacist.

## 2018-07-30 ENCOUNTER — TELEPHONE (OUTPATIENT)
Dept: FAMILY MEDICINE CLINIC | Age: 83
End: 2018-07-30

## 2018-07-30 RX ORDER — IRBESARTAN 300 MG/1
300 TABLET ORAL DAILY
Qty: 90 TABLET | Refills: 3 | Status: SHIPPED | OUTPATIENT
Start: 2018-07-30 | End: 2018-08-14 | Stop reason: SDUPTHER

## 2018-08-02 ENCOUNTER — HOSPITAL ENCOUNTER (OUTPATIENT)
Age: 83
Discharge: HOME | End: 2018-08-02
Payer: MEDICARE

## 2018-08-02 DIAGNOSIS — C61 PROSTATE CANCER (HCC): ICD-10-CM

## 2018-08-02 LAB — PROSTATE SPECIFIC ANTIGEN: < 0.02 NG/ML (ref 0–1)

## 2018-08-02 PROCEDURE — 84153 ASSAY OF PSA TOTAL: CPT

## 2018-08-02 PROCEDURE — 36415 COLL VENOUS BLD VENIPUNCTURE: CPT

## 2018-08-09 ENCOUNTER — HOSPITAL ENCOUNTER (OUTPATIENT)
Dept: RADIATION ONCOLOGY | Age: 83
Discharge: HOME | End: 2018-08-09
Payer: MEDICARE

## 2018-08-09 DIAGNOSIS — C61 ADENOCARCINOMA OF PROSTATE (HCC): Primary | ICD-10-CM

## 2018-08-09 PROCEDURE — 99211 OFF/OP EST MAY X REQ PHY/QHP: CPT | Performed by: RADIOLOGY

## 2018-08-09 NOTE — ONCOLOGY
RADIATION ONCOLOGY FOLLOW-UP:         Patient:  Siobhan Stern  YOB: 1934    Primary Oncologist:  Dr. Luke Davison  PCP:  Abdirahman Miller DO  Referring Provider: Jason Bloom,*    PROBLEM LIST:       Referral Dx:  Cancer Staging  Adenocarcinoma of prostate [facility])  Staging form: Prostate, AJCC 8th Edition  - Pathologic stage from 1999: Stage IIB (ypT2, pN0, cM0, PSA: 9.3, Grade Group: 2) - Signed by Luke Davison MD on 2018  - Clinical: No stage assigned - Unsigned    Date of diagnosis:   Completion of radiation:   Total radiation dose:   Patient Active Problem List   Diagnosis    S/P colonoscopy    HTN (hypertension)    Hyperlipidemia with target LDL less than 100    History of prostate cancer    Elevated PSA    S/P prostatectomy    Adenocarcinoma of prostate ([facility] [address]         INTERVAL HISTORY:       Siobhan Stern was last seen  Months ago and is seen now in routine follow up as part of their ongoing survivorship plan. Seen last seen     HEALTH MAINTENANCE:    * Colonoscopy     * Mammogram      * GYN/PAP     * PSA     * Vacinations - Pneumovax     Annual FLU shot     Tetanus  REVIEW OF SYSTEMS:     CONSTITUTIONAL:  Generally feels    Appetite good. Energy level stable. HEENT:  No sores in the mouth, sore throat, changes in voice quality. CARDIOVASCULAR:  No chest pain or palpitations. RESPIRATORY:  No cough, SOB or hemoptysis. GI: No N/V/D/C. No blood per rectum. :  No urinary frequency, dysuria or urgency. MUSCULOSKELETAL:   No specific bone or joint aches. No edema. No new arthritic complaint. NEUROLOGIC:  Denies HA, dizziness or focal weakness. No sensory changes.       PHYSICAL EXAM:       VS: Reviewed in EMR, afebrile and normotensive    ECO  Pain Score(scale 10): 0    AUA:  (nocturia): 3 (1)   NATHAN: 1    General appearance: alert and oriented X3, speech fluent, mentation clear  Head: Normocephalic, without obvious abnormality, outcome and possible treatment-related side effects. Greater than 50% of this time was spent in face to face discussion of current disease status and ongoing management as it pertains to their individual survivorship plan. Signature:      Maral Palma. Enrico Harris MD  Radiation Oncology  Signed electronically without final edit.

## 2018-08-14 ENCOUNTER — OFFICE VISIT (OUTPATIENT)
Dept: FAMILY MEDICINE CLINIC | Age: 83
End: 2018-08-14
Payer: MEDICARE

## 2018-08-14 VITALS
HEART RATE: 72 BPM | HEIGHT: 71 IN | SYSTOLIC BLOOD PRESSURE: 132 MMHG | RESPIRATION RATE: 20 BRPM | DIASTOLIC BLOOD PRESSURE: 82 MMHG | WEIGHT: 158 LBS | BODY MASS INDEX: 22.12 KG/M2 | TEMPERATURE: 97.6 F

## 2018-08-14 DIAGNOSIS — N30.90 CYSTITIS: Primary | ICD-10-CM

## 2018-08-14 DIAGNOSIS — C61 ADENOCARCINOMA OF PROSTATE (HCC): ICD-10-CM

## 2018-08-14 LAB
BILIRUBIN, POC: NEGATIVE
BLOOD URINE, POC: ABNORMAL
CLARITY, POC: ABNORMAL
COLOR, POC: ABNORMAL
GLUCOSE URINE, POC: NEGATIVE
KETONES, POC: NEGATIVE
LEUKOCYTE EST, POC: ABNORMAL
NITRITE, POC: NEGATIVE
PH, POC: 6.5
PROTEIN, POC: ABNORMAL
SPECIFIC GRAVITY, POC: 1.01
UROBILINOGEN, POC: ABNORMAL

## 2018-08-14 PROCEDURE — 1101F PT FALLS ASSESS-DOCD LE1/YR: CPT | Performed by: FAMILY MEDICINE

## 2018-08-14 PROCEDURE — 81003 URINALYSIS AUTO W/O SCOPE: CPT | Performed by: FAMILY MEDICINE

## 2018-08-14 PROCEDURE — 1123F ACP DISCUSS/DSCN MKR DOCD: CPT | Performed by: FAMILY MEDICINE

## 2018-08-14 PROCEDURE — G8427 DOCREV CUR MEDS BY ELIG CLIN: HCPCS | Performed by: FAMILY MEDICINE

## 2018-08-14 PROCEDURE — 1036F TOBACCO NON-USER: CPT | Performed by: FAMILY MEDICINE

## 2018-08-14 PROCEDURE — 99213 OFFICE O/P EST LOW 20 MIN: CPT | Performed by: FAMILY MEDICINE

## 2018-08-14 PROCEDURE — 4040F PNEUMOC VAC/ADMIN/RCVD: CPT | Performed by: FAMILY MEDICINE

## 2018-08-14 PROCEDURE — G8420 CALC BMI NORM PARAMETERS: HCPCS | Performed by: FAMILY MEDICINE

## 2018-08-14 RX ORDER — IRBESARTAN 300 MG/1
300 TABLET ORAL DAILY
Qty: 90 TABLET | Refills: 3 | Status: SHIPPED | OUTPATIENT
Start: 2018-08-14 | End: 2018-09-13 | Stop reason: SDUPTHER

## 2018-08-14 RX ORDER — CIPROFLOXACIN 500 MG/1
500 TABLET, FILM COATED ORAL 2 TIMES DAILY
Qty: 20 TABLET | Refills: 0 | Status: SHIPPED | OUTPATIENT
Start: 2018-08-14 | End: 2018-08-24

## 2018-08-14 NOTE — PROGRESS NOTES
Visit Information    Have you changed or started any medications since your last visit including any over-the-counter medicines, vitamins, or herbal medicines? no   Are you having any side effects from any of your medications? -  no  Have you stopped taking any of your medications? Is so, why? -  no    Have you seen any other physician or provider since your last visit? Yes, Dr. Romeo Garland  Have you had any other diagnostic tests since your last visit? Yes, labs  Have you been seen in the emergency room and/or had an admission to a hospital since we last saw you? No  Have you had your routine dental cleaning in the past 6 months? Yes    Have you activated your [employer] account? If not, what are your barriers?  No: pt declined     Patient Care Team:  Elaine Walton DO as PCP - General (Family Medicine)  Elaine Walton DO as PCP - S Attributed Provider  Aung Covington MD as Consulting Physician (Urology)    Medical History Review  Past Medical, Family, and Social History reviewed and does not contribute to the patient presenting condition    Health Maintenance   Topic Date Due    Shingles Vaccine (1 of 2 - 2 Dose Series) 02/08/1984    DTaP/Tdap/Td vaccine (1 - Tdap) 03/30/2017    Flu vaccine (1) 09/01/2018    Potassium monitoring  11/03/2018    Creatinine monitoring  11/03/2018    Pneumococcal low/med risk  Completed

## 2018-08-14 NOTE — PROGRESS NOTES
Subjective:      Patient ID: Shane Guerra is a 80 y.o. male. HPI  Encounter Diagnoses   Name Primary?  Cystitis Yes    Adenocarcinoma of prostate ([facility] [address]      Parris Shipman is here with symptoms of a urinary tract infection. He is also concerned that his valsartan has not been switched over to a different agent so the chart was reviewed and a new prescription for Avapro sent to [facility] pharmacy rather than [facility] [address] PRESTON MACDONALD. Review of Systems   Genitourinary: Positive for dysuria, frequency and urgency. Objective:   Physical Exam   Constitutional: He appears well-developed and well-nourished. Abdominal: Soft. Bowel sounds are normal. There is no tenderness. There is no CVA tenderness. Musculoskeletal: He exhibits no edema. Nursing note and vitals reviewed. Assessment:       Diagnosis Orders   1. Cystitis  irbesartan (AVAPRO) 300 MG tablet    ciprofloxacin (CIPRO) 500 MG tablet    Urine Culture   2. Adenocarcinoma of prostate ([facility] [address]  ciprofloxacin (CIPRO) 500 MG tablet           Plan:      Orders Placed This Encounter   Procedures    Urine Culture     Standing Status:   Future     Standing Expiration Date:   8/14/2019     Order Specific Question:   Specify (ex-cath, midstream, cysto, etc)? Answer:   midstream     Urinalysis is positive for leukocytes and blood. Culture will be obtained and the patient started on Cipro.     Medications Discontinued During This Encounter   Medication Reason    irbesartan (AVAPRO) 300 MG tablet REORDER     Current Outpatient Prescriptions   Medication Sig Dispense Refill    irbesartan (AVAPRO) 300 MG tablet Take 1 tablet by mouth daily 90 tablet 3    ciprofloxacin (CIPRO) 500 MG tablet Take 1 tablet by mouth 2 times daily for 10 days 20 tablet 0    simvastatin (ZOCOR) 20 MG tablet TAKE 1 TABLET BY MOUTH NIGHTLY 60 tablet 0    benazepril (LOTENSIN) 10 MG tablet Take 1 tablet by mouth daily 60 tablet 0    amLODIPine (NORVASC) 5 MG tablet Take 1 tablet by mouth

## 2018-08-17 LAB — URINE CULTURE, ROUTINE: ABNORMAL

## 2018-09-13 ENCOUNTER — OFFICE VISIT (OUTPATIENT)
Dept: FAMILY MEDICINE CLINIC | Age: 83
End: 2018-09-13
Payer: MEDICARE

## 2018-09-13 VITALS
HEIGHT: 68 IN | WEIGHT: 159 LBS | BODY MASS INDEX: 24.1 KG/M2 | RESPIRATION RATE: 16 BRPM | SYSTOLIC BLOOD PRESSURE: 136 MMHG | HEART RATE: 60 BPM | DIASTOLIC BLOOD PRESSURE: 64 MMHG

## 2018-09-13 DIAGNOSIS — Z90.79 S/P PROSTATECTOMY: ICD-10-CM

## 2018-09-13 DIAGNOSIS — I10 ESSENTIAL HYPERTENSION: ICD-10-CM

## 2018-09-13 DIAGNOSIS — N30.90 CYSTITIS: ICD-10-CM

## 2018-09-13 DIAGNOSIS — E78.5 HYPERLIPIDEMIA WITH TARGET LDL LESS THAN 100: ICD-10-CM

## 2018-09-13 DIAGNOSIS — Z85.46 HISTORY OF PROSTATE CANCER: Primary | ICD-10-CM

## 2018-09-13 PROCEDURE — 1101F PT FALLS ASSESS-DOCD LE1/YR: CPT | Performed by: FAMILY MEDICINE

## 2018-09-13 PROCEDURE — 1123F ACP DISCUSS/DSCN MKR DOCD: CPT | Performed by: FAMILY MEDICINE

## 2018-09-13 PROCEDURE — G8427 DOCREV CUR MEDS BY ELIG CLIN: HCPCS | Performed by: FAMILY MEDICINE

## 2018-09-13 PROCEDURE — 4040F PNEUMOC VAC/ADMIN/RCVD: CPT | Performed by: FAMILY MEDICINE

## 2018-09-13 PROCEDURE — G8420 CALC BMI NORM PARAMETERS: HCPCS | Performed by: FAMILY MEDICINE

## 2018-09-13 PROCEDURE — 99214 OFFICE O/P EST MOD 30 MIN: CPT | Performed by: FAMILY MEDICINE

## 2018-09-13 PROCEDURE — 1036F TOBACCO NON-USER: CPT | Performed by: FAMILY MEDICINE

## 2018-09-13 RX ORDER — AMLODIPINE BESYLATE 5 MG/1
5 TABLET ORAL DAILY
Qty: 90 TABLET | Refills: 3 | Status: SHIPPED | OUTPATIENT
Start: 2018-09-13 | End: 2019-04-08 | Stop reason: SDUPTHER

## 2018-09-13 RX ORDER — IPRATROPIUM BROMIDE 21 UG/1
2 SPRAY, METERED NASAL EVERY 12 HOURS
Qty: 3 BOTTLE | Refills: 3 | Status: SHIPPED | OUTPATIENT
Start: 2018-09-13 | End: 2019-04-08 | Stop reason: SDUPTHER

## 2018-09-13 RX ORDER — IRBESARTAN 300 MG/1
300 TABLET ORAL DAILY
Qty: 90 TABLET | Refills: 3 | Status: SHIPPED | OUTPATIENT
Start: 2018-09-13 | End: 2019-04-08 | Stop reason: SDUPTHER

## 2018-09-13 RX ORDER — SIMVASTATIN 20 MG
TABLET ORAL
Qty: 90 TABLET | Refills: 3 | Status: SHIPPED | OUTPATIENT
Start: 2018-09-13 | End: 2019-04-08 | Stop reason: SDUPTHER

## 2018-09-13 RX ORDER — BENAZEPRIL HYDROCHLORIDE 10 MG/1
10 TABLET ORAL DAILY
Qty: 180 TABLET | Refills: 3 | Status: SHIPPED | OUTPATIENT
Start: 2018-09-13 | End: 2019-04-08 | Stop reason: SDUPTHER

## 2018-09-13 ASSESSMENT — ENCOUNTER SYMPTOMS
RESPIRATORY NEGATIVE: 1
GASTROINTESTINAL NEGATIVE: 1

## 2018-09-13 NOTE — PROGRESS NOTES
Take 1,000 mg by mouth daily. Yes Historical Provider, MD   oyster calcium (OYST-VEGA) 500 MG TABS Take 1 tablet by mouth daily. Yes Historical Provider, MD   Omega 3 1000 MG CAPS Take 1 tablet by mouth daily. Yes Historical Provider, MD   Vitamin D (CHOLECALCIFEROL) 1000 UNITS CAPS capsule Take 1,000 Units by mouth daily. Yes Historical Provider, MD   b complex vitamins capsule Take 1 capsule by mouth daily.     Historical Provider, MD       Lab Results   Component Value Date    LABA1C 5.4 11/03/2017     No results found for: EAG    No components found for: CHLPL  Lab Results   Component Value Date    TRIG 95 11/03/2017    TRIG 83 06/03/2016    TRIG 104 03/03/2015     Lab Results   Component Value Date    HDL 55 11/03/2017    HDL 53 06/03/2016    HDL 53 03/03/2015     Lab Results   Component Value Date    LDLCALC 92 11/03/2017    LDLCALC 74 06/03/2016     No results found for: LABVLDL      Chemistry        Component Value Date/Time     11/03/2017 0732    K 4.7 11/03/2017 0732     11/03/2017 0732    CO2 29 11/03/2017 0732    BUN 16 11/03/2017 0732    CREATININE 0.6 11/03/2017 0732        Component Value Date/Time    CALCIUM 9.1 11/03/2017 0732    ALKPHOS 63 11/03/2017 0732    ALKPHOS 63 11/03/2017 0732    AST 15 11/03/2017 0732    AST 14 11/03/2017 0732    ALT 13 11/03/2017 0732    ALT 14 11/03/2017 0732    BILITOT 0.6 11/03/2017 0732    BILITOT 0.6 11/03/2017 0732            No results found for: TSH, [id number], K2GKYGW, THYROIDAB    Lab Results   Component Value Date    WBC 7.1 11/03/2017    HGB 15.4 11/03/2017    HCT 45.7 11/03/2017    MCV 88.8 11/03/2017     11/03/2017         Health Maintenance   Topic Date Due    Shingles Vaccine (1 of 2 - 2 Dose Series) 02/08/1984    DTaP/Tdap/Td vaccine (1 - Tdap) 03/30/2017    Flu vaccine (1) 09/01/2018    Potassium monitoring  11/03/2018    Creatinine monitoring  11/03/2018    Pneumococcal low/med risk  Completed       Immunization History

## 2018-09-13 NOTE — PATIENT INSTRUCTIONS
You may receive a survey about your visit with us today. The feedback from our patients helps us identify what is working well and where the service to all patients can be enhanced. Thank you! Patient Education        High Blood Pressure: Care Instructions  Your Care Instructions    If your blood pressure is usually above 130/80, you have high blood pressure, or hypertension. That means the top number is 130 or higher or the bottom number is 80 or higher, or both. Despite what a lot of people think, high blood pressure usually doesn't cause headaches or make you feel dizzy or lightheaded. It usually has no symptoms. But it does increase your risk for heart attack, stroke, and kidney or eye damage. The higher your blood pressure, the more your risk increases. Your doctor will give you a goal for your blood pressure. Your goal will be based on your health and your age. Lifestyle changes, such as eating healthy and being active, are always important to help lower blood pressure. You might also take medicine to reach your blood pressure goal.  Follow-up care is a key part of your treatment and safety. Be sure to make and go to all appointments, and call your doctor if you are having problems. It's also a good idea to know your test results and keep a list of the medicines you take. How can you care for yourself at home? Medical treatment  · If you stop taking your medicine, your blood pressure will go back up. You may take one or more types of medicine to lower your blood pressure. Be safe with medicines. Take your medicine exactly as prescribed. Call your doctor if you think you are having a problem with your medicine. · Talk to your doctor before you start taking aspirin every day. Aspirin can help certain people lower their risk of a heart attack or stroke. But taking aspirin isn't right for everyone, because it can cause serious bleeding. · See your doctor regularly.  You may need to see the doctor more breath. ¨ Nausea or vomiting. ¨ Pain, pressure, or a strange feeling in the back, neck, jaw, or upper belly or in one or both shoulders or arms. ¨ Lightheadedness or sudden weakness. ¨ A fast or irregular heartbeat.     · You have symptoms of a stroke. These may include:  ¨ Sudden numbness, tingling, weakness, or loss of movement in your face, arm, or leg, especially on only one side of your body. ¨ Sudden vision changes. ¨ Sudden trouble speaking. ¨ Sudden confusion or trouble understanding simple statements. ¨ Sudden problems with walking or balance. ¨ A sudden, severe headache that is different from past headaches.     · You have severe back or belly pain.    Do not wait until your blood pressure comes down on its own. Get help right away.   Call your doctor now or seek immediate care if:    · Your blood pressure is much higher than normal (such as 180/110 or higher), but you don't have symptoms.     · You think high blood pressure is causing symptoms, such as:  ¨ Severe headache. ¨ Blurry vision.    Watch closely for changes in your health, and be sure to contact your doctor if:    · Your blood pressure measures 140/90 or higher at least 2 times. That means the top number is 140 or higher or the bottom number is 90 or higher, or both.     · You think you may be having side effects from your blood pressure medicine.     · Your blood pressure is usually normal, but it goes above normal at least 2 times. Where can you learn more? Go to [email or web address] and sign in to your [employer] account. Enter L634 in the [employer] box to learn more about \"High Blood Pressure: Care Instructions. \"     If you do not have an account, please click on the \"Sign Up Now\" link. Current as of: December 6, 2017  Content Version: 11.7  © 0271-5039 [employer]. Care instructions adapted under license by [facility] ([facility]).  If you have questions about a medical condition or this

## 2018-10-08 ENCOUNTER — NURSE ONLY (OUTPATIENT)
Dept: FAMILY MEDICINE CLINIC | Age: 83
End: 2018-10-08
Payer: MEDICARE

## 2018-10-08 DIAGNOSIS — Z23 FLU VACCINE NEED: Primary | ICD-10-CM

## 2018-10-08 PROCEDURE — G0008 ADMIN INFLUENZA VIRUS VAC: HCPCS | Performed by: FAMILY MEDICINE

## 2018-10-08 PROCEDURE — 90662 IIV NO PRSV INCREASED AG IM: CPT | Performed by: FAMILY MEDICINE

## 2018-10-16 ENCOUNTER — OFFICE VISIT (OUTPATIENT)
Dept: SURGERY | Age: 83
End: 2018-10-16

## 2018-10-16 ENCOUNTER — TELEPHONE (OUTPATIENT)
Dept: SURGERY | Age: 83
End: 2018-10-16

## 2018-10-16 VITALS
HEIGHT: 70 IN | OXYGEN SATURATION: 94 % | BODY MASS INDEX: 22.85 KG/M2 | HEART RATE: 75 BPM | RESPIRATION RATE: 16 BRPM | WEIGHT: 159.6 LBS | TEMPERATURE: 97.3 F | SYSTOLIC BLOOD PRESSURE: 120 MMHG | DIASTOLIC BLOOD PRESSURE: 70 MMHG

## 2018-10-16 DIAGNOSIS — Z12.11 ENCOUNTER FOR SCREENING COLONOSCOPY: Primary | ICD-10-CM

## 2018-10-16 PROCEDURE — 99999 PR OFFICE/OUTPT VISIT,PROCEDURE ONLY: CPT | Performed by: SURGERY

## 2018-10-17 ASSESSMENT — ENCOUNTER SYMPTOMS
RHINORRHEA: 0
ALLERGIC/IMMUNOLOGIC NEGATIVE: 1
FACIAL SWELLING: 0
SORE THROAT: 0
TROUBLE SWALLOWING: 0
EYES NEGATIVE: 1
SINUS PAIN: 0
COUGH: 1
EYE DISCHARGE: 0
SINUS PRESSURE: 1
EYE REDNESS: 0
BACK PAIN: 0
COLOR CHANGE: 0
APNEA: 0
PHOTOPHOBIA: 0
EYE ITCHING: 0
STRIDOR: 0
EYE PAIN: 0
VOICE CHANGE: 0
CHEST TIGHTNESS: 0
WHEEZING: 0
GASTROINTESTINAL NEGATIVE: 1
SHORTNESS OF BREATH: 0
CHOKING: 0

## 2018-10-25 ENCOUNTER — HOSPITAL ENCOUNTER (OUTPATIENT)
Age: 83
Discharge: HOME | End: 2018-10-25
Payer: MEDICARE

## 2018-10-25 LAB — PROSTATE SPECIFIC ANTIGEN: < 0.02 NG/ML (ref 0–1)

## 2018-10-25 PROCEDURE — 84153 ASSAY OF PSA TOTAL: CPT

## 2018-10-25 PROCEDURE — 36415 COLL VENOUS BLD VENIPUNCTURE: CPT

## 2018-11-07 ENCOUNTER — OFFICE VISIT (OUTPATIENT)
Dept: UROLOGY | Age: 83
End: 2018-11-07
Payer: MEDICARE

## 2018-11-07 VITALS
DIASTOLIC BLOOD PRESSURE: 60 MMHG | BODY MASS INDEX: 22.67 KG/M2 | SYSTOLIC BLOOD PRESSURE: 108 MMHG | HEIGHT: 71 IN | WEIGHT: 161.9 LBS

## 2018-11-07 DIAGNOSIS — R97.20 ELEVATED PSA: Primary | ICD-10-CM

## 2018-11-07 LAB
BILIRUBIN URINE: NEGATIVE
BLOOD URINE, POC: NEGATIVE
CHARACTER, URINE: CLEAR
COLOR, URINE: YELLOW
GLUCOSE URINE: NEGATIVE MG/DL
KETONES, URINE: NEGATIVE
LEUKOCYTE CLUMPS, URINE: NEGATIVE
NITRITE, URINE: NEGATIVE
PH, URINE: 7
PROTEIN, URINE: NEGATIVE MG/DL
SPECIFIC GRAVITY, URINE: 1.01 (ref 1–1.03)
UROBILINOGEN, URINE: 1 EU/DL

## 2018-11-07 PROCEDURE — 1123F ACP DISCUSS/DSCN MKR DOCD: CPT | Performed by: NURSE PRACTITIONER

## 2018-11-07 PROCEDURE — 4040F PNEUMOC VAC/ADMIN/RCVD: CPT | Performed by: NURSE PRACTITIONER

## 2018-11-07 PROCEDURE — G8420 CALC BMI NORM PARAMETERS: HCPCS | Performed by: NURSE PRACTITIONER

## 2018-11-07 PROCEDURE — 81003 URINALYSIS AUTO W/O SCOPE: CPT | Performed by: NURSE PRACTITIONER

## 2018-11-07 PROCEDURE — G8482 FLU IMMUNIZE ORDER/ADMIN: HCPCS | Performed by: NURSE PRACTITIONER

## 2018-11-07 PROCEDURE — 99213 OFFICE O/P EST LOW 20 MIN: CPT | Performed by: NURSE PRACTITIONER

## 2018-11-07 PROCEDURE — 1101F PT FALLS ASSESS-DOCD LE1/YR: CPT | Performed by: NURSE PRACTITIONER

## 2018-11-07 PROCEDURE — 1036F TOBACCO NON-USER: CPT | Performed by: NURSE PRACTITIONER

## 2018-11-07 PROCEDURE — G8427 DOCREV CUR MEDS BY ELIG CLIN: HCPCS | Performed by: NURSE PRACTITIONER

## 2018-11-07 NOTE — PROGRESS NOTES
Willena Nissen is a 80 y.o. male was seen in follow up for prostate cancer. Pt has a hx of prostate cancer treated remotely with radical prostatectomy 19 or 20 years ago. 15 years out from surgery his PSA started to rise. No evidence of metastasis was noted on bone scan and CT scan and pt underwent salvage radiation. Pt's PSA continues to be undetectable at <.02.      Ivette Sierra denies dysuria, hesitancy, weak stream, urgency, frequency, gross hematuria, flank pain, fever, chills, suprapubic pain, and feeling of incomplete emptying. He also denies night sweats, poor appetite, unexplained weight loss, fatigue, malaise. Current Outpatient Prescriptions   Medication Sig Dispense Refill    amLODIPine (NORVASC) 5 MG tablet Take 1 tablet by mouth daily 90 tablet 3    benazepril (LOTENSIN) 10 MG tablet Take 1 tablet by mouth daily 180 tablet 3    ipratropium (ATROVENT) 0.03 % nasal spray 2 sprays by Nasal route every 12 hours 3 Bottle 3    irbesartan (AVAPRO) 300 MG tablet Take 1 tablet by mouth daily 90 tablet 3    simvastatin (ZOCOR) 20 MG tablet TAKE 1 TABLET BY MOUTH NIGHTLY 90 tablet 3    aspirin 81 MG tablet Take 81 mg by mouth daily       Multiple Vitamins-Minerals (MULTIVITAMIN PO) Take 1 tablet by mouth daily.  b complex vitamins capsule Take 1 capsule by mouth daily.  Ascorbic Acid (VITAMIN C) 500 MG tablet Take 1,000 mg by mouth daily.  oyster calcium (OYST-EVGA) 500 MG TABS Take 1 tablet by mouth daily.  Omega 3 1000 MG CAPS Take 1 tablet by mouth daily.  Vitamin D (CHOLECALCIFEROL) 1000 UNITS CAPS capsule Take 1,000 Units by mouth daily. No current facility-administered medications for this visit. Past Medical History  Ivette Sierra  has a past medical history of Hyperlipidemia; Hypertension; and Prostate cancer ([facility] [address] Past Surgical History  The patient  has a past surgical history that includes Inguinal hernia repair (Right, 1990's );  Inguinal hernia repair

## 2018-11-08 ENCOUNTER — HOSPITAL ENCOUNTER (OUTPATIENT)
Age: 83
Setting detail: OUTPATIENT SURGERY
Discharge: HOME | End: 2018-11-08
Attending: SURGERY | Admitting: SURGERY
Payer: MEDICARE

## 2018-11-08 VITALS
RESPIRATION RATE: 18 BRPM | WEIGHT: 156 LBS | TEMPERATURE: 98.4 F | HEART RATE: 75 BPM | HEIGHT: 70 IN | OXYGEN SATURATION: 94 % | DIASTOLIC BLOOD PRESSURE: 56 MMHG | SYSTOLIC BLOOD PRESSURE: 100 MMHG | BODY MASS INDEX: 22.33 KG/M2

## 2018-11-08 PROCEDURE — 3609027000 HC COLONOSCOPY: Performed by: SURGERY

## 2018-11-08 PROCEDURE — C1773 RET DEV, INSERTABLE: HCPCS | Performed by: SURGERY

## 2018-11-08 PROCEDURE — 2709999900 HC NON-CHARGEABLE SUPPLY: Performed by: SURGERY

## 2018-11-08 PROCEDURE — 6360000002 HC RX W HCPCS: Performed by: SURGERY

## 2018-11-08 PROCEDURE — 7100000001 HC PACU RECOVERY - ADDTL 15 MIN: Performed by: SURGERY

## 2018-11-08 PROCEDURE — G0121 COLON CA SCRN NOT HI RSK IND: HCPCS | Performed by: SURGERY

## 2018-11-08 PROCEDURE — 7100000000 HC PACU RECOVERY - FIRST 15 MIN: Performed by: SURGERY

## 2018-11-08 PROCEDURE — 2580000003 HC RX 258: Performed by: SURGERY

## 2018-11-08 RX ORDER — SODIUM CHLORIDE 450 MG/100ML
INJECTION, SOLUTION INTRAVENOUS CONTINUOUS
Status: DISCONTINUED | OUTPATIENT
Start: 2018-11-08 | End: 2018-11-08 | Stop reason: HOSPADM

## 2018-11-08 RX ORDER — FENTANYL CITRATE 50 UG/ML
INJECTION, SOLUTION INTRAMUSCULAR; INTRAVENOUS PRN
Status: DISCONTINUED | OUTPATIENT
Start: 2018-11-08 | End: 2018-11-08 | Stop reason: HOSPADM

## 2018-11-08 RX ORDER — MIDAZOLAM HYDROCHLORIDE 1 MG/ML
INJECTION INTRAMUSCULAR; INTRAVENOUS PRN
Status: DISCONTINUED | OUTPATIENT
Start: 2018-11-08 | End: 2018-11-08 | Stop reason: HOSPADM

## 2018-11-08 RX ADMIN — SODIUM CHLORIDE: 4.5 INJECTION, SOLUTION INTRAVENOUS at 07:15

## 2018-11-08 ASSESSMENT — PAIN SCALES - GENERAL
PAINLEVEL_OUTOF10: 0

## 2018-11-08 ASSESSMENT — PAIN - FUNCTIONAL ASSESSMENT: PAIN_FUNCTIONAL_ASSESSMENT: 0-10

## 2018-11-12 NOTE — OP NOTE
[address] [place] [address]                                OPERATIVE REPORT    PATIENT NAME: Lisa Ureña                    :        1934  MED REC NO:   [id number]                           ROOM:  ACCOUNT NO:   [de-identified]                           ADMIT DATE: 2018  PROVIDER:     Fariba Bolton. Sarabjit Beach MD    DATE OF PROCEDURE:  2018    PREOPERATIVE DIAGNOSIS:  Need for screening colonoscopy. POSTOPERATIVE DIAGNOSIS:  Severe diverticular disease with tortuous  sigmoid. OPERATION:  Colonoscopy. SURGEON:  Fariba Bolton. MD Clementine.    ANESTHESIA:  100 mcg of fentanyl and 8 mg of Versed. COMPLICATIONS:  None. INDICATIONS FOR PROCEDURE:  The patient is an 28-year-old white male, in  need of screening colonoscopy. FINDINGS:  The patient had a very tortuous sigmoid colon, which made the  colonoscopy difficult, had significant diverticulosis of the left colon. It took abdominal wall compression. We did the scope over to the  proximal ascending colon with view into the cecum. No polyps. No other  abnormalities noted other than the diverticula. DESCRIPTION OF PROCEDURE:  The patient was brought into the endoscopy  suite, placed in the left lateral decubitus position. After adequate IV  anesthetic was administered with 100 mcg of fentanyl and 4 mg of Versed,  the Olympus endoscope was inserted into the anus and easily passed up  through the rectum up through the sigmoid, but in the sigmoid, we  encountered a significant amount of diverticula and a very, very  tortuous sigmoid colon. The scope was able to be _____ through this  area with some difficulty, and then once we got over to the transverse  colon, it required some abdominal wall compression to get the scope down  into the ascending colon. This was accomplished. It was very difficult  to get the scope into the full extent of the cecum.   The scope was

## 2018-11-14 DIAGNOSIS — Z12.11 ENCOUNTER FOR SCREENING COLONOSCOPY: ICD-10-CM

## 2018-11-20 ENCOUNTER — HOSPITAL ENCOUNTER (OUTPATIENT)
Age: 83
Discharge: HOME | End: 2018-11-20
Payer: MEDICARE

## 2018-11-20 LAB
ALBUMIN SERPL-MCNC: 4.2 G/DL (ref 3.5–5.1)
ALP BLD-CCNC: 74 U/L (ref 38–126)
ALT SERPL-CCNC: 14 U/L (ref 11–66)
ANION GAP SERPL CALCULATED.3IONS-SCNC: 14 MEQ/L (ref 8–16)
AST SERPL-CCNC: 15 U/L (ref 5–40)
BILIRUB SERPL-MCNC: 0.7 MG/DL (ref 0.3–1.2)
BILIRUBIN DIRECT: < 0.2 MG/DL (ref 0–0.3)
BUN BLDV-MCNC: 11 MG/DL (ref 7–22)
CALCIUM SERPL-MCNC: 9.3 MG/DL (ref 8.5–10.5)
CHLORIDE BLD-SCNC: 101 MEQ/L (ref 98–111)
CHOLESTEROL, TOTAL: 149 MG/DL (ref 100–199)
CO2: 25 MEQ/L (ref 23–33)
CREAT SERPL-MCNC: 0.7 MG/DL (ref 0.4–1.2)
GFR SERPL CREATININE-BSD FRML MDRD: > 90 ML/MIN/1.73M2
GLUCOSE BLD-MCNC: 106 MG/DL (ref 70–108)
HDLC SERPL-MCNC: 53 MG/DL
LDL CHOLESTEROL CALCULATED: 79 MG/DL
POTASSIUM SERPL-SCNC: 4.9 MEQ/L (ref 3.5–5.2)
SODIUM BLD-SCNC: 140 MEQ/L (ref 135–145)
TOTAL PROTEIN: 6.9 G/DL (ref 6.1–8)
TRIGL SERPL-MCNC: 85 MG/DL (ref 0–199)

## 2018-11-20 PROCEDURE — 80053 COMPREHEN METABOLIC PANEL: CPT

## 2018-11-20 PROCEDURE — 82248 BILIRUBIN DIRECT: CPT

## 2018-11-20 PROCEDURE — 36415 COLL VENOUS BLD VENIPUNCTURE: CPT

## 2018-11-20 PROCEDURE — 80061 LIPID PANEL: CPT

## 2019-02-06 ENCOUNTER — HOSPITAL ENCOUNTER (OUTPATIENT)
Age: 84
Discharge: HOME | End: 2019-02-06
Payer: MEDICARE

## 2019-02-06 DIAGNOSIS — R97.20 ELEVATED PSA: ICD-10-CM

## 2019-02-06 LAB — PROSTATE SPECIFIC ANTIGEN: < 0.02 NG/ML (ref 0–1)

## 2019-02-06 PROCEDURE — 36415 COLL VENOUS BLD VENIPUNCTURE: CPT

## 2019-02-06 PROCEDURE — 84153 ASSAY OF PSA TOTAL: CPT

## 2019-02-20 ENCOUNTER — HOSPITAL ENCOUNTER (OUTPATIENT)
Dept: RADIATION ONCOLOGY | Age: 84
Discharge: HOME | End: 2019-02-20
Payer: MEDICARE

## 2019-02-20 DIAGNOSIS — C61 ADENOCARCINOMA OF PROSTATE (HCC): Primary | ICD-10-CM

## 2019-02-20 PROCEDURE — 99211 OFF/OP EST MAY X REQ PHY/QHP: CPT

## 2019-03-06 ENCOUNTER — OFFICE VISIT (OUTPATIENT)
Dept: FAMILY MEDICINE CLINIC | Age: 84
End: 2019-03-06
Payer: MEDICARE

## 2019-03-06 VITALS
DIASTOLIC BLOOD PRESSURE: 68 MMHG | TEMPERATURE: 97.9 F | BODY MASS INDEX: 22.55 KG/M2 | RESPIRATION RATE: 20 BRPM | HEIGHT: 70 IN | HEART RATE: 80 BPM | WEIGHT: 157.5 LBS | OXYGEN SATURATION: 97 % | SYSTOLIC BLOOD PRESSURE: 122 MMHG

## 2019-03-06 DIAGNOSIS — J40 BRONCHITIS: Primary | ICD-10-CM

## 2019-03-06 PROCEDURE — G8420 CALC BMI NORM PARAMETERS: HCPCS | Performed by: FAMILY MEDICINE

## 2019-03-06 PROCEDURE — 1101F PT FALLS ASSESS-DOCD LE1/YR: CPT | Performed by: FAMILY MEDICINE

## 2019-03-06 PROCEDURE — 1123F ACP DISCUSS/DSCN MKR DOCD: CPT | Performed by: FAMILY MEDICINE

## 2019-03-06 PROCEDURE — G8427 DOCREV CUR MEDS BY ELIG CLIN: HCPCS | Performed by: FAMILY MEDICINE

## 2019-03-06 PROCEDURE — 1036F TOBACCO NON-USER: CPT | Performed by: FAMILY MEDICINE

## 2019-03-06 PROCEDURE — 4040F PNEUMOC VAC/ADMIN/RCVD: CPT | Performed by: FAMILY MEDICINE

## 2019-03-06 PROCEDURE — G8482 FLU IMMUNIZE ORDER/ADMIN: HCPCS | Performed by: FAMILY MEDICINE

## 2019-03-06 PROCEDURE — 99213 OFFICE O/P EST LOW 20 MIN: CPT | Performed by: FAMILY MEDICINE

## 2019-03-06 RX ORDER — AZITHROMYCIN 250 MG/1
TABLET, FILM COATED ORAL
Qty: 6 TABLET | Refills: 0 | Status: SHIPPED | OUTPATIENT
Start: 2019-03-06 | End: 2019-03-16

## 2019-03-06 ASSESSMENT — PATIENT HEALTH QUESTIONNAIRE - PHQ9
SUM OF ALL RESPONSES TO PHQ QUESTIONS 1-9: 0
SUM OF ALL RESPONSES TO PHQ9 QUESTIONS 1 & 2: 0
2. FEELING DOWN, DEPRESSED OR HOPELESS: 0
1. LITTLE INTEREST OR PLEASURE IN DOING THINGS: 0
SUM OF ALL RESPONSES TO PHQ QUESTIONS 1-9: 0

## 2019-03-06 ASSESSMENT — ENCOUNTER SYMPTOMS
WHEEZING: 0
GASTROINTESTINAL NEGATIVE: 1
SHORTNESS OF BREATH: 0
RHINORRHEA: 1
SINUS PRESSURE: 1
COUGH: 1

## 2019-04-08 DIAGNOSIS — E78.5 HYPERLIPIDEMIA WITH TARGET LDL LESS THAN 100: ICD-10-CM

## 2019-04-08 DIAGNOSIS — N30.90 CYSTITIS: ICD-10-CM

## 2019-04-08 DIAGNOSIS — I10 ESSENTIAL HYPERTENSION: ICD-10-CM

## 2019-04-08 RX ORDER — IPRATROPIUM BROMIDE 21 UG/1
2 SPRAY, METERED NASAL EVERY 12 HOURS
Qty: 3 BOTTLE | Refills: 3 | Status: SHIPPED | OUTPATIENT
Start: 2019-04-08 | End: 2020-03-09

## 2019-04-08 RX ORDER — AMLODIPINE BESYLATE 5 MG/1
5 TABLET ORAL DAILY
Qty: 90 TABLET | Refills: 3 | Status: SHIPPED | OUTPATIENT
Start: 2019-04-08 | End: 2020-03-11 | Stop reason: SDUPTHER

## 2019-04-08 RX ORDER — SIMVASTATIN 20 MG
TABLET ORAL
Qty: 90 TABLET | Refills: 3 | Status: SHIPPED | OUTPATIENT
Start: 2019-04-08 | End: 2020-03-11 | Stop reason: SDUPTHER

## 2019-04-08 RX ORDER — IRBESARTAN 300 MG/1
300 TABLET ORAL DAILY
Qty: 90 TABLET | Refills: 3 | Status: SHIPPED | OUTPATIENT
Start: 2019-04-08 | End: 2020-03-11 | Stop reason: SDUPTHER

## 2019-04-08 RX ORDER — BENAZEPRIL HYDROCHLORIDE 10 MG/1
10 TABLET ORAL DAILY
Qty: 180 TABLET | Refills: 3 | Status: SHIPPED | OUTPATIENT
Start: 2019-04-08 | End: 2020-03-11 | Stop reason: SDUPTHER

## 2019-04-08 NOTE — TELEPHONE ENCOUNTER
Candelaria Thompson called requesting a refill on the following medications:  Requested Prescriptions     Pending Prescriptions Disp Refills    amLODIPine (NORVASC) 5 MG tablet 90 tablet 3     Sig: Take 1 tablet by mouth daily    benazepril (LOTENSIN) 10 MG tablet 180 tablet 3     Sig: Take 1 tablet by mouth daily    irbesartan (AVAPRO) 300 MG tablet 90 tablet 3     Sig: Take 1 tablet by mouth daily    simvastatin (ZOCOR) 20 MG tablet 90 tablet 3     Sig: TAKE 1 TABLET BY MOUTH NIGHTLY    ipratropium (ATROVENT) 0.03 % nasal spray 3 Bottle 3     Si sprays by Nasal route every 12 hours     Pharmacy verified:  [facility] on  [address]      Date of last visit: 3/6/19  Date of next visit (if applicable): Visit date not found

## 2019-05-09 ENCOUNTER — OFFICE VISIT (OUTPATIENT)
Dept: FAMILY MEDICINE CLINIC | Age: 84
End: 2019-05-09
Payer: MEDICARE

## 2019-05-09 VITALS
SYSTOLIC BLOOD PRESSURE: 136 MMHG | HEIGHT: 70 IN | HEART RATE: 60 BPM | BODY MASS INDEX: 23.03 KG/M2 | RESPIRATION RATE: 12 BRPM | DIASTOLIC BLOOD PRESSURE: 84 MMHG | WEIGHT: 160.9 LBS

## 2019-05-09 DIAGNOSIS — J01.90 ACUTE RHINOSINUSITIS: Primary | ICD-10-CM

## 2019-05-09 DIAGNOSIS — J30.2 SEASONAL ALLERGIES: ICD-10-CM

## 2019-05-09 PROCEDURE — 1036F TOBACCO NON-USER: CPT | Performed by: FAMILY MEDICINE

## 2019-05-09 PROCEDURE — G8427 DOCREV CUR MEDS BY ELIG CLIN: HCPCS | Performed by: FAMILY MEDICINE

## 2019-05-09 PROCEDURE — G8420 CALC BMI NORM PARAMETERS: HCPCS | Performed by: FAMILY MEDICINE

## 2019-05-09 PROCEDURE — 4040F PNEUMOC VAC/ADMIN/RCVD: CPT | Performed by: FAMILY MEDICINE

## 2019-05-09 PROCEDURE — 99213 OFFICE O/P EST LOW 20 MIN: CPT | Performed by: FAMILY MEDICINE

## 2019-05-09 PROCEDURE — 1123F ACP DISCUSS/DSCN MKR DOCD: CPT | Performed by: FAMILY MEDICINE

## 2019-05-09 RX ORDER — SULFAMETHOXAZOLE AND TRIMETHOPRIM 800; 160 MG/1; MG/1
1 TABLET ORAL 2 TIMES DAILY
Qty: 14 TABLET | Refills: 0 | Status: SHIPPED | OUTPATIENT
Start: 2019-05-09 | End: 2019-05-16

## 2019-05-09 ASSESSMENT — ENCOUNTER SYMPTOMS
SINUS PRESSURE: 1
RHINORRHEA: 1
GASTROINTESTINAL NEGATIVE: 1
COUGH: 1

## 2019-05-09 NOTE — PATIENT INSTRUCTIONS
You may receive a survey about your visit with us today. The feedback from our patients helps us identify what is working well and where the service to all patients can be enhanced. Thank you!       OTC Xyzal 5 mg at bedtime

## 2019-05-09 NOTE — PROGRESS NOTES
Subjective:      Patient ID: Loren Dave is a 80 y.o. male. HPI:   Chief Complaint   Patient presents with    Cough     s/s x \"a while\"    Nasal Congestion     Pt here for nasal and sinus congestion for the last few weeks. No real sinus pain or pressure, but is coughing up yellow mucus. Having a hard time clearing the throat. No wheezing but has noted some tightness. No fevers. No OTC meds. Does admit to sneezing and some itchy eyes. Has been in the yard a lot lately. Patient Active Problem List   Diagnosis    S/P colonoscopy    HTN (hypertension)    Hyperlipidemia with target LDL less than 100    History of prostate cancer    Elevated PSA    S/P prostatectomy    Adenocarcinoma of prostate [facility])     Past Surgical History:   Procedure Laterality Date    COLONOSCOPY  9-19-13    Dr. Spring Spine Right 1990's     INGUINAL HERNIA REPAIR Left 1990's     INGUINAL HERNIA REPAIR Right 1990's     [place] COLON CA SCRN NOT  [address] IND Left 11/8/2018    COLONOSCOPY performed by Onelia Galarza MD at [place] [address] in [facility]     Prior to Admission medications    Medication Sig Start Date End Date Taking? Authorizing Provider   amLODIPine (NORVASC) 5 MG tablet Take 1 tablet by mouth daily 4/8/19  Yes Miranda Lung, DO   benazepril (LOTENSIN) 10 MG tablet Take 1 tablet by mouth daily 4/8/19  Yes Miranda Lung, DO   irbesartan (AVAPRO) 300 MG tablet Take 1 tablet by mouth daily 4/8/19  Yes Miranda Lung, DO   simvastatin (ZOCOR) 20 MG tablet TAKE 1 TABLET BY MOUTH NIGHTLY 4/8/19  Yes Miranda Lung, DO   ipratropium (ATROVENT) 0.03 % nasal spray 2 sprays by Nasal route every 12 hours 4/8/19  Yes Miranda Lung, DO   aspirin 81 MG tablet Take 81 mg by mouth daily    Yes Historical Provider, MD   Multiple Vitamins-Minerals (MULTIVITAMIN PO) Take 1 tablet by mouth daily.    Yes Historical Provider, MD   b complex vitamins capsule Take 1 capsule by mouth daily. Yes Historical Provider, MD   Ascorbic Acid (VITAMIN C) 500 MG tablet Take 1,000 mg by mouth daily. Yes Historical Provider, MD   oyster calcium (OYST-VEGA) 500 MG TABS Take 1 tablet by mouth daily. Yes Historical Provider, MD   Omega 3 1000 MG CAPS Take 1 tablet by mouth daily. Yes Historical Provider, MD   Vitamin D (CHOLECALCIFEROL) 1000 UNITS CAPS capsule Take 1,000 Units by mouth daily. Yes Historical Provider, MD         Review of Systems   Constitutional: Negative. HENT: Positive for congestion, postnasal drip, rhinorrhea and sinus pressure. Respiratory: Positive for cough. Cardiovascular: Negative. Gastrointestinal: Negative. Musculoskeletal: Negative. All other systems reviewed and are negative. Objective:   Physical Exam   Constitutional: He is oriented to person, place, and time. He appears well-developed and well-nourished. HENT:   Head: Normocephalic and atraumatic. Right Ear: A middle ear effusion is present. Left Ear: A middle ear effusion is present. Nose: Mucosal edema and rhinorrhea present. Right sinus exhibits maxillary sinus tenderness. Left sinus exhibits maxillary sinus tenderness. Mouth/Throat: Oropharynx is clear and moist and mucous membranes are normal.   Cardiovascular: Normal rate, regular rhythm and normal heart sounds. No murmur heard. Pulmonary/Chest: Effort normal and breath sounds normal.   Abdominal: Soft. Bowel sounds are normal.   Musculoskeletal: He exhibits no edema. Neurological: He is alert and oriented to person, place, and time. Skin: Skin is warm and dry. Psychiatric: He has a normal mood and affect. His behavior is normal.   Nursing note and vitals reviewed. Assessment:       Diagnosis Orders   1. Acute rhinosinusitis  sulfamethoxazole-trimethoprim (BACTRIM DS) 800-160 MG per tablet   2.  Seasonal allergies           Plan:      -  rx Bactrim sent (allergic to PCN and \"cyclines\")  -  Start OTC Xyzal  -  RTO prn        Ernesto Cole DO

## 2019-05-09 NOTE — PROGRESS NOTES
Visit Information    Have you changed or started any medications since your last visit including any over-the-counter medicines, vitamins, or herbal medicines? no   Are you having any side effects from any of your medications? -  no  Have you stopped taking any of your medications? Is so, why? -  no    Have you seen any other physician or provider since your last visit? No  Have you had any other diagnostic tests since your last visit? No  Have you been seen in the emergency room and/or had an admission to a hospital since we last saw you? No  Have you had your routine dental cleaning in the past 6 months? n/a    Have you activated your [employer] account? If not, what are your barriers?  No: declines     Patient Care Team:  Latha Campbell DO as PCP - General (Family Medicine)  Latha Campbell DO as PCP - S Attributed Provider  Yani Oshea MD as Consulting Physician (Urology)  Gasper Vega MD as Consulting Physician (Cardiology)  Jeronimo Gaitan MD (Radiation Oncology)    Medical History Review  Past Medical, Family, and Social History reviewed and does not contribute to the patient presenting condition    Health Maintenance   Topic Date Due    Shingles Vaccine (1 of 2) 02/08/1984    DTaP/Tdap/Td vaccine (1 - Tdap) 03/30/2017    Potassium monitoring  11/20/2019    Creatinine monitoring  11/20/2019    Flu vaccine  Completed    Pneumococcal 65+ years Vaccine  Completed

## 2019-07-23 ENCOUNTER — HOSPITAL ENCOUNTER (OUTPATIENT)
Age: 84
Discharge: HOME | End: 2019-07-23
Payer: MEDICARE

## 2019-07-23 ENCOUNTER — TELEPHONE (OUTPATIENT)
Dept: UROLOGY | Age: 84
End: 2019-07-23

## 2019-07-23 DIAGNOSIS — R97.20 ELEVATED PSA: ICD-10-CM

## 2019-07-23 LAB — PROSTATE SPECIFIC ANTIGEN: < 0.02 NG/ML (ref 0–1)

## 2019-07-23 PROCEDURE — 84153 ASSAY OF PSA TOTAL: CPT

## 2019-07-23 PROCEDURE — 36415 COLL VENOUS BLD VENIPUNCTURE: CPT

## 2019-07-24 NOTE — TELEPHONE ENCOUNTER
Contacted patient's wife Prabha Rojas, on HIPAA, and results of PSA were given to her. She voiced understanding and stated she would let the patient know.

## 2019-08-06 ENCOUNTER — OFFICE VISIT (OUTPATIENT)
Dept: FAMILY MEDICINE CLINIC | Age: 84
End: 2019-08-06
Payer: MEDICARE

## 2019-08-06 VITALS
RESPIRATION RATE: 20 BRPM | HEART RATE: 84 BPM | SYSTOLIC BLOOD PRESSURE: 132 MMHG | TEMPERATURE: 98 F | WEIGHT: 161.4 LBS | DIASTOLIC BLOOD PRESSURE: 72 MMHG | BODY MASS INDEX: 23.16 KG/M2 | OXYGEN SATURATION: 96 %

## 2019-08-06 DIAGNOSIS — J40 BRONCHITIS: Primary | ICD-10-CM

## 2019-08-06 PROCEDURE — G8420 CALC BMI NORM PARAMETERS: HCPCS | Performed by: FAMILY MEDICINE

## 2019-08-06 PROCEDURE — 1036F TOBACCO NON-USER: CPT | Performed by: FAMILY MEDICINE

## 2019-08-06 PROCEDURE — G8427 DOCREV CUR MEDS BY ELIG CLIN: HCPCS | Performed by: FAMILY MEDICINE

## 2019-08-06 PROCEDURE — 1123F ACP DISCUSS/DSCN MKR DOCD: CPT | Performed by: FAMILY MEDICINE

## 2019-08-06 PROCEDURE — 99213 OFFICE O/P EST LOW 20 MIN: CPT | Performed by: FAMILY MEDICINE

## 2019-08-06 PROCEDURE — 4040F PNEUMOC VAC/ADMIN/RCVD: CPT | Performed by: FAMILY MEDICINE

## 2019-08-06 RX ORDER — PREDNISONE 20 MG/1
20 TABLET ORAL 2 TIMES DAILY
Qty: 10 TABLET | Refills: 0 | Status: SHIPPED | OUTPATIENT
Start: 2019-08-06 | End: 2021-06-01

## 2019-08-06 RX ORDER — BENZONATATE 100 MG/1
100 CAPSULE ORAL 3 TIMES DAILY PRN
Qty: 15 CAPSULE | Refills: 0 | Status: SHIPPED | OUTPATIENT
Start: 2019-08-06 | End: 2019-08-11

## 2019-08-06 ASSESSMENT — ENCOUNTER SYMPTOMS
SORE THROAT: 1
GASTROINTESTINAL NEGATIVE: 1
COUGH: 1
CHEST TIGHTNESS: 1
WHEEZING: 1

## 2019-08-20 ENCOUNTER — TELEPHONE (OUTPATIENT)
Dept: FAMILY MEDICINE CLINIC | Age: 84
End: 2019-08-20

## 2019-08-20 ENCOUNTER — OFFICE VISIT (OUTPATIENT)
Dept: FAMILY MEDICINE CLINIC | Age: 84
End: 2019-08-20
Payer: MEDICARE

## 2019-08-20 VITALS
OXYGEN SATURATION: 95 % | RESPIRATION RATE: 18 BRPM | WEIGHT: 160.6 LBS | DIASTOLIC BLOOD PRESSURE: 60 MMHG | BODY MASS INDEX: 23.04 KG/M2 | HEART RATE: 76 BPM | SYSTOLIC BLOOD PRESSURE: 118 MMHG | TEMPERATURE: 98.2 F

## 2019-08-20 DIAGNOSIS — J40 BRONCHITIS: Primary | ICD-10-CM

## 2019-08-20 DIAGNOSIS — Z91.09 ENVIRONMENTAL ALLERGIES: ICD-10-CM

## 2019-08-20 PROCEDURE — 1123F ACP DISCUSS/DSCN MKR DOCD: CPT | Performed by: FAMILY MEDICINE

## 2019-08-20 PROCEDURE — G8427 DOCREV CUR MEDS BY ELIG CLIN: HCPCS | Performed by: FAMILY MEDICINE

## 2019-08-20 PROCEDURE — G8420 CALC BMI NORM PARAMETERS: HCPCS | Performed by: FAMILY MEDICINE

## 2019-08-20 PROCEDURE — 96372 THER/PROPH/DIAG INJ SC/IM: CPT | Performed by: FAMILY MEDICINE

## 2019-08-20 PROCEDURE — 99213 OFFICE O/P EST LOW 20 MIN: CPT | Performed by: FAMILY MEDICINE

## 2019-08-20 PROCEDURE — 1036F TOBACCO NON-USER: CPT | Performed by: FAMILY MEDICINE

## 2019-08-20 PROCEDURE — 4040F PNEUMOC VAC/ADMIN/RCVD: CPT | Performed by: FAMILY MEDICINE

## 2019-08-20 RX ORDER — METHYLPREDNISOLONE ACETATE 80 MG/ML
80 INJECTION, SUSPENSION INTRA-ARTICULAR; INTRALESIONAL; INTRAMUSCULAR; SOFT TISSUE ONCE
Status: COMPLETED | OUTPATIENT
Start: 2019-08-20 | End: 2019-08-20

## 2019-08-20 RX ORDER — METHYLPREDNISOLONE ACETATE 40 MG/ML
40 INJECTION, SUSPENSION INTRA-ARTICULAR; INTRALESIONAL; INTRAMUSCULAR; SOFT TISSUE ONCE
Status: COMPLETED | OUTPATIENT
Start: 2019-08-20 | End: 2019-08-20

## 2019-08-20 RX ORDER — AZITHROMYCIN 250 MG/1
TABLET, FILM COATED ORAL
Qty: 6 TABLET | Refills: 0 | Status: SHIPPED | OUTPATIENT
Start: 2019-08-20 | End: 2019-08-30

## 2019-08-20 RX ADMIN — METHYLPREDNISOLONE ACETATE 80 MG: 80 INJECTION, SUSPENSION INTRA-ARTICULAR; INTRALESIONAL; INTRAMUSCULAR; SOFT TISSUE at 09:19

## 2019-08-20 RX ADMIN — METHYLPREDNISOLONE ACETATE 40 MG: 40 INJECTION, SUSPENSION INTRA-ARTICULAR; INTRALESIONAL; INTRAMUSCULAR; SOFT TISSUE at 09:18

## 2019-08-20 ASSESSMENT — ENCOUNTER SYMPTOMS
COUGH: 1
SINUS PAIN: 0
CHEST TIGHTNESS: 1
WHEEZING: 0
GASTROINTESTINAL NEGATIVE: 1
SINUS PRESSURE: 1

## 2019-08-20 NOTE — PATIENT INSTRUCTIONS
You may receive a survey regarding the care you received during your visit. Your input is valuable to us. We encourage you to complete and return your survey. We hope you will choose us in the future for your healthcare needs. Patient Education        Bronchitis: Care Instructions  Your Care Instructions    Bronchitis is inflammation of the bronchial tubes, which carry air to the lungs. The tubes swell and produce mucus, or phlegm. The mucus and inflamed bronchial tubes make you cough. You may have trouble breathing. Most cases of bronchitis are caused by viruses like those that cause colds. Antibiotics usually do not help and they may be harmful. Bronchitis usually develops rapidly and lasts about 2 to 3 weeks in otherwise healthy people. Follow-up care is a key part of your treatment and safety. Be sure to make and go to all appointments, and call your doctor if you are having problems. It's also a good idea to know your test results and keep a list of the medicines you take. How can you care for yourself at home? · Take all medicines exactly as prescribed. Call your doctor if you think you are having a problem with your medicine. · Get some extra rest.  · Take an over-the-counter pain medicine, such as acetaminophen (Tylenol), ibuprofen (Advil, Motrin), or naproxen (Aleve) to reduce fever and relieve body aches. Read and follow all instructions on the label. · Do not take two or more pain medicines at the same time unless the doctor told you to. Many pain medicines have acetaminophen, which is Tylenol. Too much acetaminophen (Tylenol) can be harmful. · Take an over-the-counter cough medicine that contains dextromethorphan to help quiet a dry, hacking cough so that you can sleep. Avoid cough medicines that have more than one active ingredient. Read and follow all instructions on the label. · Breathe moist air from a humidifier, hot shower, or sink filled with hot water.  The heat and moisture will

## 2019-08-20 NOTE — PROGRESS NOTES
Subjective:      Patient ID: Kyle Santoro is a 80 y.o. male. HPI:    Chief Complaint   Patient presents with    Cough     productive thick yellow drainage, PND, slight improvement with Prednisone script      Pt here for cough, congestion, thick yellow drainage for the last few weeks. Was seen here on the 6th, prednisone given with some relief. No fevers. No wheezing. No sinus pressure. Patient Active Problem List   Diagnosis    S/P colonoscopy    HTN (hypertension)    Hyperlipidemia with target LDL less than 100    History of prostate cancer    Elevated PSA    S/P prostatectomy    Adenocarcinoma of prostate [facility])     Past Surgical History:   Procedure Laterality Date    COLONOSCOPY  9-19-13    Dr. Posey More Right 1990's     INGUINAL HERNIA REPAIR Left 1990's     INGUINAL HERNIA REPAIR Right 1990's     [place] COLON CA SCRN NOT  [address] IND Left 11/8/2018    COLONOSCOPY performed by Griselda Galli, MD at [place] [address] in [facility]     Prior to Admission medications    Medication Sig Start Date End Date Taking? Authorizing Provider   amLODIPine (NORVASC) 5 MG tablet Take 1 tablet by mouth daily 4/8/19  Yes Rachelle Saleh, DO   benazepril (LOTENSIN) 10 MG tablet Take 1 tablet by mouth daily 4/8/19  Yes Rachelle Saleh DO   irbesartan (AVAPRO) 300 MG tablet Take 1 tablet by mouth daily 4/8/19  Yes Rachelle Saleh DO   simvastatin (ZOCOR) 20 MG tablet TAKE 1 TABLET BY MOUTH NIGHTLY 4/8/19  Yes Rachelle Saleh, DO   ipratropium (ATROVENT) 0.03 % nasal spray 2 sprays by Nasal route every 12 hours 4/8/19  Yes Rachelle Saleh DO   aspirin 81 MG tablet Take 81 mg by mouth daily    Yes Historical Provider, MD   Multiple Vitamins-Minerals (MULTIVITAMIN PO) Take 1 tablet by mouth daily. Yes Historical Provider, MD   b complex vitamins capsule Take 1 capsule by mouth daily.    Yes Historical

## 2019-09-04 ENCOUNTER — HOSPITAL ENCOUNTER (OUTPATIENT)
Dept: RADIATION ONCOLOGY | Age: 84
Discharge: HOME | End: 2019-09-04
Payer: MEDICARE

## 2019-09-04 VITALS
WEIGHT: 159.17 LBS | HEART RATE: 57 BPM | TEMPERATURE: 97.3 F | BODY MASS INDEX: 22.84 KG/M2 | DIASTOLIC BLOOD PRESSURE: 80 MMHG | RESPIRATION RATE: 16 BRPM | OXYGEN SATURATION: 95 % | SYSTOLIC BLOOD PRESSURE: 130 MMHG

## 2019-09-04 PROCEDURE — 99212 OFFICE O/P EST SF 10 MIN: CPT | Performed by: RADIOLOGY

## 2019-09-04 NOTE — PROGRESS NOTES
follow up as part of their ongoing survivorship plan. Since last seen he has been treated for infectious bronchitis which has now cleared. REVIEW OF SYSTEMS:     CONSTITUTIONAL:  Generally feels well  Appetite good. Energy level stable. HEENT:  No sores in the mouth, sore throat, changes in voice quality. CARDIOVASCULAR:  No chest pain or palpitations. RESPIRATORY:  No cough, SOB or hemoptysis. GI: No N/V/D/C. No blood per rectum. :  No urinary frequency, dysuria or urgency. MUSCULOSKELETAL:   No specific bone or joint aches. No edema. No new arthritic complaint. NEUROLOGIC:  Denies HA, dizziness or focal weakness. No sensory changes. PHYSICAL EXAM:       VS: recorded and reviewed in departmental EMR. Afebrile, normotensive  /80   Pulse 57   Temp 97.3 °F (36.3 °C) (Tympanic)   Resp 16   Wt 159 lb 2.8 oz (72.2 kg)   SpO2 95%   BMI 22.84 kg/m²    ECO  Pain Score(scale 10):    AUA:  (nocturia): 8 (1)   NATHAN: 1    General appearance: alert and oriented X3, speech fluent, mentation clear  Head: Normocephalic, without obvious abnormality, atraumatic, EOMI and concordant, sclerae anicteric, nasal passages unobstructed. Thorax:  Lungs clear to auscultation bilaterally, no focal wheeze, rhonchi or rales. Breast nontender, no significant gynecomastia. Heart: Regular rate and rhythm,   Abdomen: Active bowel sounds present, soft, non-tender;  no mass,  no organomegaly  /Rectal: External genitalia without abnormality, lesion or discharge. Patient is continent of urine at this time. No significant external hemorrhoidal tissue. Prostate fossa without palpable induration or nodularity. Within the limits of this exam I do not appreciate an intrinsic rectal mass.   Extremities: without cyanosis, clubbing, edema or asymmetry  Skin: No fresh rash, purpura or petechiae    LABS:     CBC:   Lab Results   Component Value Date    WBC 7.1 2017    HGB 15.4 2017    MCV 88.8

## 2019-10-02 ENCOUNTER — NURSE ONLY (OUTPATIENT)
Dept: FAMILY MEDICINE CLINIC | Age: 84
End: 2019-10-02
Payer: MEDICARE

## 2019-10-02 DIAGNOSIS — Z23 NEED FOR INFLUENZA VACCINATION: Primary | ICD-10-CM

## 2019-10-02 PROCEDURE — G0008 ADMIN INFLUENZA VIRUS VAC: HCPCS | Performed by: FAMILY MEDICINE

## 2019-10-02 PROCEDURE — 90653 IIV ADJUVANT VACCINE IM: CPT | Performed by: FAMILY MEDICINE

## 2019-10-29 ENCOUNTER — HOSPITAL ENCOUNTER (OUTPATIENT)
Age: 84
Discharge: HOME | End: 2019-10-29
Payer: MEDICARE

## 2019-10-29 DIAGNOSIS — C61 ADENOCARCINOMA OF PROSTATE (HCC): ICD-10-CM

## 2019-10-29 PROCEDURE — 84153 ASSAY OF PSA TOTAL: CPT

## 2019-10-29 PROCEDURE — 36415 COLL VENOUS BLD VENIPUNCTURE: CPT

## 2019-10-30 ENCOUNTER — TELEPHONE (OUTPATIENT)
Dept: UROLOGY | Age: 84
End: 2019-10-30

## 2019-10-30 LAB — PROSTATE SPECIFIC ANTIGEN: < 0.02 NG/ML (ref 0–1)

## 2020-03-09 RX ORDER — IPRATROPIUM BROMIDE 21 UG/1
SPRAY, METERED NASAL
Qty: 1 BOTTLE | Refills: 3 | Status: SHIPPED | OUTPATIENT
Start: 2020-03-09 | End: 2020-06-03

## 2020-03-11 ENCOUNTER — OFFICE VISIT (OUTPATIENT)
Dept: FAMILY MEDICINE CLINIC | Age: 85
End: 2020-03-11
Payer: MEDICARE

## 2020-03-11 VITALS
TEMPERATURE: 97.9 F | BODY MASS INDEX: 22.76 KG/M2 | HEART RATE: 60 BPM | DIASTOLIC BLOOD PRESSURE: 72 MMHG | SYSTOLIC BLOOD PRESSURE: 124 MMHG | RESPIRATION RATE: 20 BRPM | WEIGHT: 158.6 LBS

## 2020-03-11 PROCEDURE — 1036F TOBACCO NON-USER: CPT | Performed by: FAMILY MEDICINE

## 2020-03-11 PROCEDURE — G8482 FLU IMMUNIZE ORDER/ADMIN: HCPCS | Performed by: FAMILY MEDICINE

## 2020-03-11 PROCEDURE — 1123F ACP DISCUSS/DSCN MKR DOCD: CPT | Performed by: FAMILY MEDICINE

## 2020-03-11 PROCEDURE — 4040F PNEUMOC VAC/ADMIN/RCVD: CPT | Performed by: FAMILY MEDICINE

## 2020-03-11 PROCEDURE — G8427 DOCREV CUR MEDS BY ELIG CLIN: HCPCS | Performed by: FAMILY MEDICINE

## 2020-03-11 PROCEDURE — G8420 CALC BMI NORM PARAMETERS: HCPCS | Performed by: FAMILY MEDICINE

## 2020-03-11 PROCEDURE — 99214 OFFICE O/P EST MOD 30 MIN: CPT | Performed by: FAMILY MEDICINE

## 2020-03-11 RX ORDER — IRBESARTAN 300 MG/1
300 TABLET ORAL DAILY
Qty: 90 TABLET | Refills: 3 | Status: SHIPPED | OUTPATIENT
Start: 2020-03-11 | End: 2021-02-22

## 2020-03-11 RX ORDER — BENAZEPRIL HYDROCHLORIDE 10 MG/1
10 TABLET ORAL DAILY
Qty: 180 TABLET | Refills: 3 | Status: SHIPPED | OUTPATIENT
Start: 2020-03-11 | End: 2021-05-27

## 2020-03-11 RX ORDER — SIMVASTATIN 20 MG
TABLET ORAL
Qty: 90 TABLET | Refills: 3 | Status: SHIPPED | OUTPATIENT
Start: 2020-03-11 | End: 2021-04-21

## 2020-03-11 RX ORDER — AMLODIPINE BESYLATE 5 MG/1
5 TABLET ORAL DAILY
Qty: 90 TABLET | Refills: 3 | Status: SHIPPED | OUTPATIENT
Start: 2020-03-11 | End: 2021-06-05

## 2020-03-11 SDOH — ECONOMIC STABILITY: TRANSPORTATION INSECURITY
IN THE PAST 12 MONTHS, HAS THE LACK OF TRANSPORTATION KEPT YOU FROM MEDICAL APPOINTMENTS OR FROM GETTING MEDICATIONS?: NO

## 2020-03-11 SDOH — ECONOMIC STABILITY: TRANSPORTATION INSECURITY
IN THE PAST 12 MONTHS, HAS LACK OF TRANSPORTATION KEPT YOU FROM MEETINGS, WORK, OR FROM GETTING THINGS NEEDED FOR DAILY LIVING?: NO

## 2020-03-11 SDOH — ECONOMIC STABILITY: INCOME INSECURITY: HOW HARD IS IT FOR YOU TO PAY FOR THE VERY BASICS LIKE FOOD, HOUSING, MEDICAL CARE, AND HEATING?: NOT HARD AT ALL

## 2020-03-11 SDOH — ECONOMIC STABILITY: FOOD INSECURITY: WITHIN THE PAST 12 MONTHS, THE FOOD YOU BOUGHT JUST DIDN'T LAST AND YOU DIDN'T HAVE MONEY TO GET MORE.: NEVER TRUE

## 2020-03-11 SDOH — ECONOMIC STABILITY: FOOD INSECURITY: WITHIN THE PAST 12 MONTHS, YOU WORRIED THAT YOUR FOOD WOULD RUN OUT BEFORE YOU GOT MONEY TO BUY MORE.: NEVER TRUE

## 2020-03-11 ASSESSMENT — PATIENT HEALTH QUESTIONNAIRE - PHQ9
2. FEELING DOWN, DEPRESSED OR HOPELESS: 0
SUM OF ALL RESPONSES TO PHQ QUESTIONS 1-9: 0
SUM OF ALL RESPONSES TO PHQ QUESTIONS 1-9: 0
SUM OF ALL RESPONSES TO PHQ9 QUESTIONS 1 & 2: 0
1. LITTLE INTEREST OR PLEASURE IN DOING THINGS: 0

## 2020-03-11 ASSESSMENT — ENCOUNTER SYMPTOMS
GASTROINTESTINAL NEGATIVE: 1
COUGH: 1

## 2020-03-11 NOTE — PROGRESS NOTES
Subjective:      Patient ID: Doris Nance is a 80 y.o. male. HPI:    Chief Complaint   Patient presents with    6 Month Follow-Up    Medication Refill    Cough     for the past 4 - 5 weeks     6 month eval.  Doing well overall. Pt c/o cough for the last 4-5 weeks. Cough is productive. No wheezing. No fevers. Denies sinus pain and or pressure. He has had multiple run-ins with PND and persistent bronchitis. He is on an ACE-I but pt does not feel this is the cause. Denies GERD. BPs well controlled. BP Readings from Last 3 Encounters:   03/11/20 124/72   09/04/19 130/80   08/20/19 118/60     Weight stable. Wt Readings from Last 3 Encounters:   03/11/20 158 lb 9.6 oz (71.9 kg)   09/04/19 159 lb 2.8 oz (72.2 kg)   08/20/19 160 lb 9.6 oz (72.8 kg)     Due for labs. Hx of prostate cancer, seeing Dr. Annika Patel on a yearly basis. Patient Active Problem List   Diagnosis    S/P colonoscopy    HTN (hypertension)    Hyperlipidemia with target LDL less than 100    History of prostate cancer    Elevated PSA    S/P prostatectomy    Adenocarcinoma of prostate [facility])     Past Surgical History:   Procedure Laterality Date    COLONOSCOPY  9-19-13    Dr. Landis Fleischer Right 1990's     INGUINAL HERNIA REPAIR Left 1990's     INGUINAL HERNIA REPAIR Right 1990's     [place] COLON [place] SCRN NOT  [address] [facility] Left 11/8/2018    COLONOSCOPY performed by Hilda Lara MD at [address]' [facility]     Prior to Admission medications    Medication Sig Start Date End Date Taking?  Authorizing Provider   ipratropium (ATROVENT) 0.03 % nasal spray SPRAY 2 SPRAYS BY NASAL ROUTE EVERY 12 HOURS 3/9/20  Yes Estefanía Manzanares DO   amLODIPine [facility]) 5 MG tablet Take 1 tablet by mouth daily 4/8/19  Yes Estefanía Manzanares DO   benazepril (LOTENSIN) 10 MG tablet Take 1 tablet by mouth daily 4/8/19  Yes Estefanía Manzanares DO irbesartan (AVAPRO) 300 MG tablet Take 1 tablet by mouth daily 4/8/19  Yes Ramses Kinney DO   simvastatin (ZOCOR) 20 MG tablet TAKE 1 TABLET BY MOUTH NIGHTLY 4/8/19  Yes Ramses Kinney DO   aspirin 81 MG tablet Take 81 mg by mouth daily    Yes Historical Provider, MD   Multiple Vitamins-Minerals (MULTIVITAMIN PO) Take 1 tablet by mouth daily. Yes Historical Provider, MD   b complex vitamins capsule Take 1 capsule by mouth daily. Yes Historical Provider, MD   Omega 3 1000 MG CAPS Take 1 tablet by mouth daily. Yes Historical Provider, MD   Vitamin D (CHOLECALCIFEROL) 1000 UNITS CAPS capsule Take 1,000 Units by mouth daily.    Yes Historical Provider, MD       Lab Results   Component Value Date    LABA1C 5.4 11/03/2017     No results found for: EAG    No components found for: CHLPL  Lab Results   Component Value Date    TRIG 85 11/20/2018    TRIG 95 11/03/2017    TRIG 83 06/03/2016     Lab Results   Component Value Date    HDL 53 11/20/2018    HDL 55 11/03/2017    HDL 53 06/03/2016     Lab Results   Component Value Date    LDLCALC 79 11/20/2018    LDLCALC 92 11/03/2017    LDLCALC 74 06/03/2016     No results found for: LABVLDL      Chemistry        Component Value Date/Time     11/20/2018 1040    K 4.9 11/20/2018 1040     11/20/2018 1040    CO2 25 11/20/2018 1040    BUN 11 11/20/2018 1040    CREATININE 0.7 11/20/2018 1040        Component Value Date/Time    CALCIUM 9.3 11/20/2018 1040    ALKPHOS 74 11/20/2018 1040    AST 15 11/20/2018 1040    ALT 14 11/20/2018 1040    BILITOT 0.7 11/20/2018 1040            No results found for: TSH, B1VNKFQ, O6JXYCC, THYROIDAB    Lab Results   Component Value Date    WBC 7.1 11/03/2017    HGB 15.4 11/03/2017    HCT 45.7 11/03/2017    MCV 88.8 11/03/2017     11/03/2017         Health Maintenance   Topic Date Due    DTaP/Tdap/Td vaccine (1 - Tdap) 02/08/1953    Shingles Vaccine (1 of 2) 02/08/1984    Annual Wellness Visit (AWV)  05/29/2019    and time. Psychiatric:         Behavior: Behavior normal.         Assessment:       Diagnosis Orders   1. Persistent cough for 3 weeks or longer     2. Essential hypertension  amLODIPine (NORVASC) 5 MG tablet    benazepril (LOTENSIN) 10 MG tablet    irbesartan (AVAPRO) 300 MG tablet    Comprehensive Metabolic Panel    CBC Auto Differential   3. Hyperlipidemia with target LDL less than 100  simvastatin (ZOCOR) 20 MG tablet    Lipid Panel    Comprehensive Metabolic Panel    TSH with Reflex   4. Environmental allergies     5. Adenocarcinoma of prostate (HCC)  PSA, Prostatic Specific Antigen   6.  IFG (impaired fasting glucose)  Comprehensive Metabolic Panel    Hemoglobin A1C           Plan:      -  Chronic medical problems stable  -  Continue current medications  -  Follow up with specialists as scheduled  -  Check labs, will call  -  #1 likely post-viral in nature, will just take time  -  RTO annually        Oli Moya DO

## 2020-03-11 NOTE — PROGRESS NOTES
Visit Information    Have you changed or started any medications since your last visit including any over-the-counter medicines, vitamins, or herbal medicines? no   Are you having any side effects from any of your medications? -  no  Have you stopped taking any of your medications? Is so, why? -  no    Have you seen any other physician or provider since your last visit? Yes - Records Requested  Have you had any other diagnostic tests since your last visit? Yes - Records Requested  Have you been seen in the emergency room and/or had an admission to a hospital since we last saw you? No  Have you had your routine dental cleaning in the past 6 months? n/a    Have you activated your [employer] account? If not, what are your barriers?  No: declined     Patient Care Team:  Mily Amador DO as PCP - General (Family Medicine)  Mily Amador DO as PCP - [facility]  Raciel Mitchell MD as Consulting Physician (Urology)  Carmencita Davis MD as Consulting Physician (Cardiology)  Javier Enriquez MD (Radiation Oncology)    Medical History Review  Past Medical, Family, and Social History reviewed and does contribute to the patient presenting condition    Health Maintenance   Topic Date Due    DTaP/Tdap/Td vaccine (1 - Tdap) 02[birth year]/1953    Shingles Vaccine (1 of 2) 02/08/1984    Annual Wellness Visit (AWV)  05/29/2019    Lipid screen  11/20/2019    Potassium monitoring  11/20/2019    Creatinine monitoring  11/20/2019    PSA counseling  10/29/2020    Flu vaccine  Completed    Pneumococcal 65+ years Vaccine  Completed    Hepatitis A vaccine  Aged Out    Hepatitis B vaccine  Aged Out    Hib vaccine  Aged Out    Meningococcal (ACWY) vaccine  Aged Out

## 2020-06-03 RX ORDER — IPRATROPIUM BROMIDE 21 UG/1
SPRAY, METERED NASAL
Qty: 1 BOTTLE | Refills: 1 | Status: SHIPPED | OUTPATIENT
Start: 2020-06-03 | End: 2020-06-29

## 2020-06-29 RX ORDER — IPRATROPIUM BROMIDE 21 UG/1
SPRAY, METERED NASAL
Qty: 1 BOTTLE | Refills: 1 | Status: SHIPPED | OUTPATIENT
Start: 2020-06-29

## 2020-08-24 ENCOUNTER — OFFICE VISIT (OUTPATIENT)
Dept: FAMILY MEDICINE CLINIC | Age: 85
End: 2020-08-24
Payer: MEDICARE

## 2020-08-24 VITALS
TEMPERATURE: 97.2 F | SYSTOLIC BLOOD PRESSURE: 120 MMHG | HEART RATE: 64 BPM | DIASTOLIC BLOOD PRESSURE: 64 MMHG | WEIGHT: 152.2 LBS | RESPIRATION RATE: 12 BRPM | HEIGHT: 71 IN | BODY MASS INDEX: 21.31 KG/M2

## 2020-08-24 PROCEDURE — 1123F ACP DISCUSS/DSCN MKR DOCD: CPT | Performed by: FAMILY MEDICINE

## 2020-08-24 PROCEDURE — G8427 DOCREV CUR MEDS BY ELIG CLIN: HCPCS | Performed by: FAMILY MEDICINE

## 2020-08-24 PROCEDURE — G8420 CALC BMI NORM PARAMETERS: HCPCS | Performed by: FAMILY MEDICINE

## 2020-08-24 PROCEDURE — 1036F TOBACCO NON-USER: CPT | Performed by: FAMILY MEDICINE

## 2020-08-24 PROCEDURE — 4040F PNEUMOC VAC/ADMIN/RCVD: CPT | Performed by: FAMILY MEDICINE

## 2020-08-24 PROCEDURE — 99213 OFFICE O/P EST LOW 20 MIN: CPT | Performed by: FAMILY MEDICINE

## 2020-08-24 RX ORDER — CLINDAMYCIN HYDROCHLORIDE 300 MG/1
300 CAPSULE ORAL 3 TIMES DAILY
Qty: 30 CAPSULE | Refills: 0 | Status: SHIPPED | OUTPATIENT
Start: 2020-08-24 | End: 2020-09-03

## 2020-08-24 ASSESSMENT — ENCOUNTER SYMPTOMS
GASTROINTESTINAL NEGATIVE: 1
SORE THROAT: 1
RESPIRATORY NEGATIVE: 1

## 2020-08-24 NOTE — PROGRESS NOTES
Subjective:      Patient ID: Sridhar Wiggins is a 80 y.o. male. HPI:    Chief Complaint   Patient presents with    Other     swollen lymph node right side of neck x 3 days     Pt here for swollen lymph node to the right side of his neck x 3 days. Seems to be a little smaller today. Since last visit, he had some teeth removed due to chronic infection that they could not get rid of. No fevers. Pain will start in his throat and radiate to his ear. Patient Active Problem List   Diagnosis    S/P colonoscopy    HTN (hypertension)    Hyperlipidemia with target LDL less than 100    History of prostate cancer    Elevated PSA    S/P prostatectomy    Adenocarcinoma of prostate [facility])     Past Surgical History:   Procedure Laterality Date    COLONOSCOPY  9-19-13    Dr. Taisha Diggs Right 1990's     INGUINAL HERNIA REPAIR Left 1990's     INGUINAL HERNIA REPAIR Right 1990's     [place] COLON CA SCRN NOT  [address] Left 11/8/2018    COLONOSCOPY performed by Alberto Rogers MD at [place] [address] in [facility]     Prior to Admission medications    Medication Sig Start Date End Date Taking? Authorizing Provider   ipratropium (ATROVENT) 0.03 % nasal spray SPRAY 2 SPRAYS BY NASAL ROUTE EVERY 12 HOURS 6/29/20  Yes Sagar Sanchez,    amLODIPine (NORVASC) 5 MG tablet Take 1 tablet by mouth daily 3/11/20  Yes Sagar Sanchez DO   benazepril (LOTENSIN) 10 MG tablet Take 1 tablet by mouth daily 3/11/20  Yes Sagar Sanchez DO   irbesartan (AVAPRO) 300 MG tablet Take 1 tablet by mouth daily 3/11/20  Yes Sagar Sanchez DO   simvastatin (ZOCOR) 20 MG tablet TAKE 1 TABLET BY MOUTH NIGHTLY 3/11/20  Yes Sagar Sanchez DO   aspirin 81 MG tablet Take 81 mg by mouth daily    Yes Historical Provider, MD   Multiple Vitamins-Minerals (MULTIVITAMIN PO) Take 1 tablet by mouth daily.    Yes Historical Provider, MD   b complex vitamins capsule Take 1 capsule by mouth daily. Yes Historical Provider, MD   Omega 3 1000 MG CAPS Take 1 tablet by mouth daily. Yes Historical Provider, MD   Vitamin D (CHOLECALCIFEROL) 1000 UNITS CAPS capsule Take 1,000 Units by mouth daily. Yes Historical Provider, MD         Review of Systems   Constitutional: Negative. HENT: Positive for ear pain and sore throat. Negative for ear discharge. Respiratory: Negative. Cardiovascular: Negative. Gastrointestinal: Negative. Musculoskeletal: Negative. Hematological: Positive for adenopathy (right side of neck). All other systems reviewed and are negative. Objective:   Physical Exam  Vitals signs and nursing note reviewed. Constitutional:       Appearance: He is well-developed. HENT:      Head: Normocephalic and atraumatic. Right Ear: Tympanic membrane normal.      Left Ear: Tympanic membrane normal.      Mouth/Throat:     Cardiovascular:      Rate and Rhythm: Normal rate and regular rhythm. Heart sounds: Normal heart sounds. No murmur. Pulmonary:      Effort: Pulmonary effort is normal.      Breath sounds: Normal breath sounds. Abdominal:      General: Bowel sounds are normal.      Palpations: Abdomen is soft. Skin:     General: Skin is warm and dry. Neurological:      Mental Status: He is alert and oriented to person, place, and time. Psychiatric:         Behavior: Behavior normal.         Assessment:       Diagnosis Orders   1.  Abscess of oral tissue  clindamycin (CLEOCIN) 300 MG capsule           Plan:      -  Orders above  -  Salt water gargles  -  Motrin prn  -  RTO prn        Will Gess, DO

## 2020-08-24 NOTE — PROGRESS NOTES
Visit Information    Have you changed or started any medications since your last visit including any over-the-counter medicines, vitamins, or herbal medicines? no   Are you having any side effects from any of your medications? -  no  Have you stopped taking any of your medications? Is so, why? -  no    Have you seen any other physician or provider since your last visit? No  Have you had any other diagnostic tests since your last visit? No  Have you been seen in the emergency room and/or had an admission to a hospital since we last saw you? No  Have you had your routine dental cleaning in the past 6 months? no    Have you activated your [employer] account? If not, what are your barriers?  No: declines     Patient Care Team:  Hailey Ware DO as PCP - General (Family Medicine)  Hailey Ware DO as PCP - [facility] Provider  Anibal Wise MD as Consulting Physician (Urology)  Katherine Hawthorne MD as Consulting Physician (Cardiology)  Sim Manuel MD (Radiation Oncology)    Medical History Review  Past Medical, Family, and Social History reviewed and does contribute to the patient presenting condition    Health Maintenance   Topic Date Due    DTaP/Tdap/Td vaccine (1 - Tdap) 02[birth year]08/1953    Shingles Vaccine (1 of 2) 02/08/1984    Annual Wellness Visit (AWV)  05/29/2019    Lipid screen  11/20/2019    Potassium monitoring  11/20/2019    Creatinine monitoring  11/20/2019    Flu vaccine (1) 09/01/2020    PSA counseling  10/29/2020    Pneumococcal 65+ years Vaccine  Completed    Hepatitis A vaccine  Aged Out    Hepatitis B vaccine  Aged Out    Hib vaccine  Aged Out    Meningococcal (ACWY) vaccine  Aged Out

## 2020-09-04 ENCOUNTER — HOSPITAL ENCOUNTER (OUTPATIENT)
Age: 85
Discharge: HOME | End: 2020-09-04
Payer: MEDICARE

## 2020-09-04 DIAGNOSIS — C61 ADENOCARCINOMA OF PROSTATE (HCC): ICD-10-CM

## 2020-09-04 PROCEDURE — 84153 ASSAY OF PSA TOTAL: CPT

## 2020-09-04 PROCEDURE — 36415 COLL VENOUS BLD VENIPUNCTURE: CPT

## 2020-09-05 LAB — PROSTATE SPECIFIC ANTIGEN: < 0.02 NG/ML (ref 0–1)

## 2020-09-09 ENCOUNTER — HOSPITAL ENCOUNTER (OUTPATIENT)
Dept: RADIATION ONCOLOGY | Age: 85
Discharge: HOME | End: 2020-09-09
Payer: MEDICARE

## 2020-09-09 VITALS
SYSTOLIC BLOOD PRESSURE: 140 MMHG | BODY MASS INDEX: 21.73 KG/M2 | WEIGHT: 155.8 LBS | DIASTOLIC BLOOD PRESSURE: 78 MMHG | RESPIRATION RATE: 16 BRPM | TEMPERATURE: 97.2 F | OXYGEN SATURATION: 95 % | HEART RATE: 77 BPM

## 2020-09-09 PROCEDURE — 99213 OFFICE O/P EST LOW 20 MIN: CPT | Performed by: NURSE PRACTITIONER

## 2020-09-09 PROCEDURE — 99212 OFFICE O/P EST SF 10 MIN: CPT | Performed by: NURSE PRACTITIONER

## 2020-09-09 NOTE — PROGRESS NOTES
this time. He denies urinary urgency, frequency, dysuria, hematuria, constipation, diarrhea, painful bowel movements or hematochezia. He admits to nocturia 2 times per night. He is recovering from oral cavity abscess and just completed antibiotics on Monday. AUA symptom score: 8 (Moderate)  Quality of life score: 1 (Pleased)  NATHAN inventory: 5 (severe ED)    LAB RESULTS:   PSA's:  9/4/2020: <0.02  10/30/19: <0.02  7/23/19: <0.02  2/6/19: <0.02  10/25/18: <0.02  8/2/18: <0.02  5/2/18: <0.02  1/11/18: <0.02  11/3/17: <0.02  7/16/17: <0.02  1/19/17: <0.02  10/6/16: 0.03  7/11/16: 0.07  4/11/16: 0.24 --- First PSA s/p XRT  12/4/15: 0.43  9/3/15: 0.41  6/22/15: 0.37       RADIOLOGY RESULTS:   No recent imaging    MEDICATIONS:   Current Outpatient Medications   Medication Sig Dispense Refill    ipratropium (ATROVENT) 0.03 % nasal spray SPRAY 2 SPRAYS BY NASAL ROUTE EVERY 12 HOURS 1 Bottle 1    amLODIPine (NORVASC) 5 MG tablet Take 1 tablet by mouth daily 90 tablet 3    benazepril (LOTENSIN) 10 MG tablet Take 1 tablet by mouth daily 180 tablet 3    irbesartan (AVAPRO) 300 MG tablet Take 1 tablet by mouth daily 90 tablet 3    simvastatin (ZOCOR) 20 MG tablet TAKE 1 TABLET BY MOUTH NIGHTLY 90 tablet 3    aspirin 81 MG tablet Take 81 mg by mouth daily       Multiple Vitamins-Minerals (MULTIVITAMIN PO) Take 1 tablet by mouth daily.  b complex vitamins capsule Take 1 capsule by mouth daily.  Omega 3 1000 MG CAPS Take 1 tablet by mouth daily.  Vitamin D (CHOLECALCIFEROL) 1000 UNITS CAPS capsule Take 1,000 Units by mouth daily. No current facility-administered medications for this encounter. ROS: As noted in the HPI and Interval history, otherwise negative. EXAMINATION:   GENERAL: Flaco Perez is a pleasant well developed adult male. He is alert and oriented x3 in no acute distress.    VITAL SIGNS:  Weight: 70.7kg, Temperature: 36.8 celsius, Pulse: 77, BP: 140/78, Respirations: 16, Pulse

## 2020-10-12 ENCOUNTER — IMMUNIZATION (OUTPATIENT)
Dept: FAMILY MEDICINE CLINIC | Age: 85
End: 2020-10-12
Payer: MEDICARE

## 2020-10-12 VITALS — TEMPERATURE: 97.6 F

## 2020-10-12 PROCEDURE — 90694 VACC AIIV4 NO PRSRV 0.5ML IM: CPT | Performed by: FAMILY MEDICINE

## 2020-10-12 PROCEDURE — G0008 ADMIN INFLUENZA VIRUS VAC: HCPCS | Performed by: FAMILY MEDICINE

## 2020-10-12 NOTE — PROGRESS NOTES
After obtaining consent, and per orders of Dr. Abilio Kerns, injection of  Fluad 0.5 ML given IM in Left deltoid by Zeynep Robins ([facility]). Patient instructed to  report any adverse reaction to me immediately. Patient tolerated well. VIS given.     Immunizations Administered     Name Date Dose Route    Influenza, Quadv, adjuvanted, 65 yrs +, IM, PF (Fluad) 10/12/2020 0.5 mL Intramuscular    Site: Deltoid- Left    Lot: [id number]    NDC: 97099-433-23

## 2020-10-30 ENCOUNTER — HOSPITAL ENCOUNTER (OUTPATIENT)
Age: 85
Discharge: HOME | End: 2020-10-30
Payer: MEDICARE

## 2020-10-30 DIAGNOSIS — R73.01 IFG (IMPAIRED FASTING GLUCOSE): ICD-10-CM

## 2020-10-30 DIAGNOSIS — I10 ESSENTIAL HYPERTENSION: ICD-10-CM

## 2020-10-30 DIAGNOSIS — E78.5 HYPERLIPIDEMIA WITH TARGET LDL LESS THAN 100: ICD-10-CM

## 2020-10-30 DIAGNOSIS — C61 ADENOCARCINOMA OF PROSTATE (HCC): ICD-10-CM

## 2020-10-30 LAB
ALBUMIN SERPL-MCNC: 4.6 G/DL (ref 3.5–5.1)
ALP BLD-CCNC: 85 U/L (ref 38–126)
ALT SERPL-CCNC: 13 U/L (ref 11–66)
ANION GAP SERPL CALCULATED.3IONS-SCNC: 10 MEQ/L (ref 8–16)
AST SERPL-CCNC: 16 U/L (ref 5–40)
AVERAGE GLUCOSE: 105 MG/DL (ref 70–126)
BASOPHILS # BLD: 1.6 %
BASOPHILS ABSOLUTE: 0.1 THOU/MM3 (ref 0–0.1)
BILIRUB SERPL-MCNC: 0.8 MG/DL (ref 0.3–1.2)
BUN BLDV-MCNC: 14 MG/DL (ref 7–22)
CALCIUM SERPL-MCNC: 9.4 MG/DL (ref 8.5–10.5)
CHLORIDE BLD-SCNC: 101 MEQ/L (ref 98–111)
CHOLESTEROL, TOTAL: 188 MG/DL (ref 100–199)
CO2: 27 MEQ/L (ref 23–33)
CREAT SERPL-MCNC: 0.6 MG/DL (ref 0.4–1.2)
EOSINOPHIL # BLD: 6.2 %
EOSINOPHILS ABSOLUTE: 0.5 THOU/MM3 (ref 0–0.4)
ERYTHROCYTE [DISTWIDTH] IN BLOOD BY AUTOMATED COUNT: 12.8 % (ref 11.5–14.5)
ERYTHROCYTE [DISTWIDTH] IN BLOOD BY AUTOMATED COUNT: 43.4 FL (ref 35–45)
GFR SERPL CREATININE-BSD FRML MDRD: > 90 ML/MIN/1.73M2
GLUCOSE BLD-MCNC: 111 MG/DL (ref 70–108)
HBA1C MFR BLD: 5.5 % (ref 4.4–6.4)
HCT VFR BLD CALC: 49.3 % (ref 42–52)
HDLC SERPL-MCNC: 55 MG/DL
HEMOGLOBIN: 15.8 GM/DL (ref 14–18)
IMMATURE GRANS (ABS): 0.03 THOU/MM3 (ref 0–0.07)
IMMATURE GRANULOCYTES: 0.4 %
LDL CHOLESTEROL CALCULATED: 112 MG/DL
LYMPHOCYTES # BLD: 25.1 %
LYMPHOCYTES ABSOLUTE: 1.9 THOU/MM3 (ref 1–4.8)
MCH RBC QN AUTO: 29.5 PG (ref 26–33)
MCHC RBC AUTO-ENTMCNC: 32 GM/DL (ref 32.2–35.5)
MCV RBC AUTO: 92.1 FL (ref 80–94)
MONOCYTES # BLD: 11.7 %
MONOCYTES ABSOLUTE: 0.9 THOU/MM3 (ref 0.4–1.3)
NUCLEATED RED BLOOD CELLS: 0 /100 WBC
PLATELET # BLD: 243 THOU/MM3 (ref 130–400)
PMV BLD AUTO: 10.4 FL (ref 9.4–12.4)
POTASSIUM SERPL-SCNC: 4.5 MEQ/L (ref 3.5–5.2)
RBC # BLD: 5.35 MILL/MM3 (ref 4.7–6.1)
SEG NEUTROPHILS: 55 %
SEGMENTED NEUTROPHILS ABSOLUTE COUNT: 4.1 THOU/MM3 (ref 1.8–7.7)
SODIUM BLD-SCNC: 138 MEQ/L (ref 135–145)
TOTAL PROTEIN: 7.4 G/DL (ref 6.1–8)
TRIGL SERPL-MCNC: 105 MG/DL (ref 0–199)
TSH SERPL DL<=0.05 MIU/L-ACNC: 1.06 UIU/ML (ref 0.4–4.2)
WBC # BLD: 7.4 THOU/MM3 (ref 4.8–10.8)

## 2020-10-30 PROCEDURE — 84153 ASSAY OF PSA TOTAL: CPT

## 2020-10-30 PROCEDURE — 84443 ASSAY THYROID STIM HORMONE: CPT

## 2020-10-30 PROCEDURE — 80053 COMPREHEN METABOLIC PANEL: CPT

## 2020-10-30 PROCEDURE — 36415 COLL VENOUS BLD VENIPUNCTURE: CPT

## 2020-10-30 PROCEDURE — 83036 HEMOGLOBIN GLYCOSYLATED A1C: CPT

## 2020-10-30 PROCEDURE — 80061 LIPID PANEL: CPT

## 2020-10-30 PROCEDURE — 85025 COMPLETE CBC W/AUTO DIFF WBC: CPT

## 2020-10-31 LAB — PROSTATE SPECIFIC ANTIGEN: < 0.02 NG/ML (ref 0–1)

## 2020-11-30 ENCOUNTER — TELEPHONE (OUTPATIENT)
Dept: FAMILY MEDICINE CLINIC | Age: 85
End: 2020-11-30

## 2020-11-30 NOTE — TELEPHONE ENCOUNTER
Have pt schedule an in-office appt with me sometime this week for re-evaluation. May need to order a CT if suspicious.

## 2020-12-02 ENCOUNTER — OFFICE VISIT (OUTPATIENT)
Dept: FAMILY MEDICINE CLINIC | Age: 85
End: 2020-12-02
Payer: MEDICARE

## 2020-12-02 VITALS
OXYGEN SATURATION: 95 % | SYSTOLIC BLOOD PRESSURE: 120 MMHG | DIASTOLIC BLOOD PRESSURE: 60 MMHG | BODY MASS INDEX: 21.24 KG/M2 | HEART RATE: 84 BPM | TEMPERATURE: 97.9 F | RESPIRATION RATE: 18 BRPM | WEIGHT: 152.3 LBS

## 2020-12-02 PROCEDURE — 1123F ACP DISCUSS/DSCN MKR DOCD: CPT | Performed by: FAMILY MEDICINE

## 2020-12-02 PROCEDURE — 99213 OFFICE O/P EST LOW 20 MIN: CPT | Performed by: FAMILY MEDICINE

## 2020-12-02 PROCEDURE — G8427 DOCREV CUR MEDS BY ELIG CLIN: HCPCS | Performed by: FAMILY MEDICINE

## 2020-12-02 PROCEDURE — G8420 CALC BMI NORM PARAMETERS: HCPCS | Performed by: FAMILY MEDICINE

## 2020-12-02 PROCEDURE — G8484 FLU IMMUNIZE NO ADMIN: HCPCS | Performed by: FAMILY MEDICINE

## 2020-12-02 PROCEDURE — 4040F PNEUMOC VAC/ADMIN/RCVD: CPT | Performed by: FAMILY MEDICINE

## 2020-12-02 PROCEDURE — 1036F TOBACCO NON-USER: CPT | Performed by: FAMILY MEDICINE

## 2020-12-02 ASSESSMENT — ENCOUNTER SYMPTOMS
RESPIRATORY NEGATIVE: 1
GASTROINTESTINAL NEGATIVE: 1

## 2020-12-02 NOTE — PROGRESS NOTES
Visit Information    Have you changed or started any medications since your last visit including any over-the-counter medicines, vitamins, or herbal medicines? yes  Are you having any side effects from any of your medications? -  no  Have you stopped taking any of your medications? Is so, why? -  no    Have you seen any other physician or provider since your last visit? Yes - Records Requested  Have you had any other diagnostic tests since your last visit? No  Have you been seen in the emergency room and/or had an admission to a hospital since we last saw you? No  Have you had your routine dental cleaning in the past 6 months? yes -     Have you activated your [employer] account? If not, what are your barriers?  No:      Patient Care Team:  Judith Lopez DO as PCP - General (Family Medicine)  Judith Lopez DO as PCP - [facility]  Dimple Keating MD as Consulting Physician (Urology)  Wei Borrero MD as Consulting Physician (Cardiology)  Blanca Bauer MD (Radiation Oncology)    Medical History Review  Past Medical, Family, and Social History reviewed and does not contribute to the patient presenting condition    Health Maintenance   Topic Date Due    DTaP/Tdap/Td vaccine (1 - Tdap) 02[birth year]08/1953    Shingles Vaccine (1 of 2) 02/08/1984    Annual Wellness Visit (AWV)  05/29/2019    Lipid screen  10/30/2021    Potassium monitoring  10/30/2021    Creatinine monitoring  10/30/2021    PSA counseling  10/30/2021    Flu vaccine  Completed    Pneumococcal 65+ years Vaccine  Completed    Hepatitis A vaccine  Aged Out    Hepatitis B vaccine  Aged Out    Hib vaccine  Aged Out    Meningococcal (ACWY) vaccine  Aged Out

## 2020-12-02 NOTE — PATIENT INSTRUCTIONS
You may receive a survey regarding the care you received during your visit. Your input is valuable to us. We encourage you to complete and return your survey. We hope you will choose us in the future for your healthcare needs.     VIVIANA ARAIZASt. [facility]  Address: [address] [place] [address], [employer], [place]   Phone: [phone]

## 2020-12-02 NOTE — PROGRESS NOTES
Subjective:      Patient ID: Perico Beebe is a 80 y.o. male. HPI:    Chief Complaint   Patient presents with   Chrissy Rede     pt had upper teeth pulled with abscess that are clear, now glands are swollen neck right side      Pt here for follow up on his adenopathy. He continues to have issues with his teeth, has a hx of abscess. See note from 8/24. Continues to follow closely with his Dentist.    No fevers. He does have some dry mouth. Patient Active Problem List   Diagnosis    S/P colonoscopy    HTN (hypertension)    Hyperlipidemia with target LDL less than 100    History of prostate cancer    Elevated PSA    S/P prostatectomy    Adenocarcinoma of prostate [facility])     Past Surgical History:   Procedure Laterality Date    COLONOSCOPY  9-19-13    Dr. Marco Richard Right 1990's     INGUINAL HERNIA REPAIR Left 1990's     INGUINAL HERNIA REPAIR Right 1990's     [place] COLON CA SCRN NOT  [address] Left 11/8/2018    COLONOSCOPY performed by Tee Hodge MD at [place] [address] in [facility]     Prior to Admission medications    Medication Sig Start Date End Date Taking? Authorizing Provider   ipratropium (ATROVENT) 0.03 % nasal spray SPRAY 2 SPRAYS BY NASAL ROUTE EVERY 12 HOURS 6/29/20  Yes Sun Kang, DO   amLODIPine (NORVASC) 5 MG tablet Take 1 tablet by mouth daily 3/11/20  Yes Sun Kang DO   benazepril (LOTENSIN) 10 MG tablet Take 1 tablet by mouth daily 3/11/20  Yes Sun Kang DO   irbesartan (AVAPRO) 300 MG tablet Take 1 tablet by mouth daily 3/11/20  Yes Sun Kang DO   simvastatin (ZOCOR) 20 MG tablet TAKE 1 TABLET BY MOUTH NIGHTLY 3/11/20  Yes Sun Kang DO   aspirin 81 MG tablet Take 81 mg by mouth daily    Yes Historical Provider, MD   Multiple Vitamins-Minerals (MULTIVITAMIN PO) Take 1 tablet by mouth daily.    Yes Historical Provider, MD march complex vitamins capsule Take 1 capsule by mouth daily. Yes Historical Provider, MD   Omega 3 1000 MG CAPS Take 1 tablet by mouth daily. Yes Historical Provider, MD   Vitamin D (CHOLECALCIFEROL) 1000 UNITS CAPS capsule Take 1,000 Units by mouth daily. Yes Historical Provider, MD   CRANBERRY PO Take by mouth daily    Historical Provider, MD         Review of Systems   Constitutional: Negative. HENT: Negative. Respiratory: Negative. Cardiovascular: Negative. Gastrointestinal: Negative. Musculoskeletal: Negative. Hematological: Positive for adenopathy. All other systems reviewed and are negative. Objective:   Physical Exam  Vitals signs and nursing note reviewed. Constitutional:       General: He is not in acute distress. Appearance: Normal appearance. He is well-developed. HENT:      Head: Normocephalic and atraumatic. Right Ear: Tympanic membrane normal.      Left Ear: Tympanic membrane normal.   Eyes:      Conjunctiva/sclera: Conjunctivae normal.   Neck:      Musculoskeletal: Neck supple. Cardiovascular:      Rate and Rhythm: Normal rate and regular rhythm. Heart sounds: Normal heart sounds. No murmur. Pulmonary:      Effort: Pulmonary effort is normal.      Breath sounds: Normal breath sounds. No wheezing, rhonchi or rales. Abdominal:      General: There is no distension. Skin:     General: Skin is warm and dry. Findings: No rash (on exposed surfaces). Neurological:      General: No focal deficit present. Mental Status: He is alert. Psychiatric:         Attention and Perception: Attention normal.         Mood and Affect: Mood normal.         Speech: Speech normal.         Behavior: Behavior normal. Behavior is cooperative. Thought Content: Thought content normal.         Judgment: Judgment normal.         Assessment:       Diagnosis Orders   1. Palpable lymph node     2.  Denture irritation             Plan:      -  Pt reassurance, node

## 2020-12-03 ENCOUNTER — HOSPITAL ENCOUNTER (OUTPATIENT)
Age: 85
Discharge: HOME | End: 2020-12-03
Payer: MEDICARE

## 2020-12-03 LAB
ALBUMIN SERPL-MCNC: 4.2 G/DL (ref 3.5–5.1)
ALP BLD-CCNC: 71 U/L (ref 38–126)
ALT SERPL-CCNC: 13 U/L (ref 11–66)
ANION GAP SERPL CALCULATED.3IONS-SCNC: 10 MEQ/L (ref 8–16)
AST SERPL-CCNC: 15 U/L (ref 5–40)
BILIRUB SERPL-MCNC: 0.7 MG/DL (ref 0.3–1.2)
BILIRUBIN DIRECT: < 0.2 MG/DL (ref 0–0.3)
BUN BLDV-MCNC: 10 MG/DL (ref 7–22)
CALCIUM SERPL-MCNC: 9.4 MG/DL (ref 8.5–10.5)
CHLORIDE BLD-SCNC: 98 MEQ/L (ref 98–111)
CHOLESTEROL, TOTAL: 150 MG/DL (ref 100–199)
CO2: 28 MEQ/L (ref 23–33)
CREAT SERPL-MCNC: 0.8 MG/DL (ref 0.4–1.2)
ERYTHROCYTE [DISTWIDTH] IN BLOOD BY AUTOMATED COUNT: 12.8 % (ref 11.5–14.5)
ERYTHROCYTE [DISTWIDTH] IN BLOOD BY AUTOMATED COUNT: 43.4 FL (ref 35–45)
GFR SERPL CREATININE-BSD FRML MDRD: > 90 ML/MIN/1.73M2
GLUCOSE BLD-MCNC: 108 MG/DL (ref 70–108)
HCT VFR BLD CALC: 48.5 % (ref 42–52)
HDLC SERPL-MCNC: 52 MG/DL
HEMOGLOBIN: 15.6 GM/DL (ref 14–18)
LDL CHOLESTEROL CALCULATED: 76 MG/DL
MCH RBC QN AUTO: 29.6 PG (ref 26–33)
MCHC RBC AUTO-ENTMCNC: 32.2 GM/DL (ref 32.2–35.5)
MCV RBC AUTO: 92 FL (ref 80–94)
PLATELET # BLD: 233 THOU/MM3 (ref 130–400)
PMV BLD AUTO: 10.1 FL (ref 9.4–12.4)
POTASSIUM SERPL-SCNC: 4.8 MEQ/L (ref 3.5–5.2)
RBC # BLD: 5.27 MILL/MM3 (ref 4.7–6.1)
SODIUM BLD-SCNC: 136 MEQ/L (ref 135–145)
T4 FREE: 1.6 NG/DL (ref 0.93–1.76)
TOTAL PROTEIN: 6.4 G/DL (ref 6.1–8)
TRIGL SERPL-MCNC: 108 MG/DL (ref 0–199)
TSH SERPL DL<=0.05 MIU/L-ACNC: 1.12 UIU/ML (ref 0.4–4.2)
WBC # BLD: 7.8 THOU/MM3 (ref 4.8–10.8)

## 2020-12-03 PROCEDURE — 80061 LIPID PANEL: CPT

## 2020-12-03 PROCEDURE — 82248 BILIRUBIN DIRECT: CPT

## 2020-12-03 PROCEDURE — 36415 COLL VENOUS BLD VENIPUNCTURE: CPT

## 2020-12-03 PROCEDURE — 84439 ASSAY OF FREE THYROXINE: CPT

## 2020-12-03 PROCEDURE — 84443 ASSAY THYROID STIM HORMONE: CPT

## 2020-12-03 PROCEDURE — 85027 COMPLETE CBC AUTOMATED: CPT

## 2020-12-03 PROCEDURE — 80053 COMPREHEN METABOLIC PANEL: CPT

## 2021-01-18 ENCOUNTER — OFFICE VISIT (OUTPATIENT)
Dept: FAMILY MEDICINE CLINIC | Age: 86
End: 2021-01-18
Payer: MEDICARE

## 2021-01-18 VITALS
HEART RATE: 80 BPM | RESPIRATION RATE: 16 BRPM | BODY MASS INDEX: 21.14 KG/M2 | WEIGHT: 151.6 LBS | SYSTOLIC BLOOD PRESSURE: 122 MMHG | DIASTOLIC BLOOD PRESSURE: 86 MMHG | TEMPERATURE: 97.2 F

## 2021-01-18 DIAGNOSIS — R59.9 PALPABLE LYMPH NODE: Primary | ICD-10-CM

## 2021-01-18 PROCEDURE — G8420 CALC BMI NORM PARAMETERS: HCPCS | Performed by: FAMILY MEDICINE

## 2021-01-18 PROCEDURE — 1123F ACP DISCUSS/DSCN MKR DOCD: CPT | Performed by: FAMILY MEDICINE

## 2021-01-18 PROCEDURE — G8427 DOCREV CUR MEDS BY ELIG CLIN: HCPCS | Performed by: FAMILY MEDICINE

## 2021-01-18 PROCEDURE — 4040F PNEUMOC VAC/ADMIN/RCVD: CPT | Performed by: FAMILY MEDICINE

## 2021-01-18 PROCEDURE — 99213 OFFICE O/P EST LOW 20 MIN: CPT | Performed by: FAMILY MEDICINE

## 2021-01-18 PROCEDURE — G8484 FLU IMMUNIZE NO ADMIN: HCPCS | Performed by: FAMILY MEDICINE

## 2021-01-18 PROCEDURE — 1036F TOBACCO NON-USER: CPT | Performed by: FAMILY MEDICINE

## 2021-01-18 ASSESSMENT — PATIENT HEALTH QUESTIONNAIRE - PHQ9
2. FEELING DOWN, DEPRESSED OR HOPELESS: 0
SUM OF ALL RESPONSES TO PHQ QUESTIONS 1-9: 0

## 2021-01-18 ASSESSMENT — ENCOUNTER SYMPTOMS
GASTROINTESTINAL NEGATIVE: 1
RESPIRATORY NEGATIVE: 1

## 2021-01-18 NOTE — PROGRESS NOTES
Candy Underwood (:  1934) is a 80 y.o. male,Established patient, here for evaluation of the following chief complaint(s):  Adenopathy (right side neck)      SUBJECTIVE/OBJECTIVE:  HPI:    Chief Complaint   Patient presents with   Alexander Meyer     right side neck     Pt here for ongoing swollen node on the right side of his neck. The node is not getting any bigger but it bothers him. Per pt, it is not getting any bigger since her last visit. Continues to have issues with his teeth, this has been an ongoing issue for him. Has been following closely with his Dentist for this. Patient Active Problem List   Diagnosis    S/P colonoscopy    HTN (hypertension)    Hyperlipidemia with target LDL less than 100    History of prostate cancer    Elevated PSA    S/P prostatectomy    Adenocarcinoma of prostate [facility])     Past Surgical History:   Procedure Laterality Date    COLONOSCOPY  13    Dr. Bradford Flores Right      INGUINAL HERNIA REPAIR Left      INGUINAL HERNIA REPAIR Right      [place] COLON CA SCRN NOT  [address] Left 2018    COLONOSCOPY performed by Kari Coleman MD at [place] [address] in [address] [facility] prostatectomy     Prior to Admission medications    Medication Sig Start Date End Date Taking?  Authorizing Provider   CRANBERRY PO Take by mouth daily   Yes Historical Provider, MD   ipratropium (ATROVENT) 0.03 % nasal spray SPRAY 2 SPRAYS BY NASAL ROUTE EVERY 12 HOURS 20  Yes Dharmesh Gannon,    amLODIPine (NORVASC) 5 MG tablet Take 1 tablet by mouth daily 3/11/20  Yes Dharmesh Gannon DO   benazepril (LOTENSIN) 10 MG tablet Take 1 tablet by mouth daily 3/11/20  Yes Dharmesh Gannon DO   irbesartan (AVAPRO) 300 MG tablet Take 1 tablet by mouth daily 3/11/20  Yes Dharmesh Gannon DO   simvastatin (ZOCOR) 20 MG tablet TAKE 1 TABLET BY MOUTH NIGHTLY 3/11/20  Yes Dharmesh Gannon, DO aspirin 81 MG tablet Take 81 mg by mouth daily    Yes Historical Provider, MD   Multiple Vitamins-Minerals (MULTIVITAMIN PO) Take 1 tablet by mouth daily. Yes Historical Provider, MD   b complex vitamins capsule Take 1 capsule by mouth daily. Yes Historical Provider, MD   Omega 3 1000 MG CAPS Take 1 tablet by mouth daily. Yes Historical Provider, MD   Vitamin D (CHOLECALCIFEROL) 1000 UNITS CAPS capsule Take 1,000 Units by mouth daily. Yes Historical Provider, MD         Review of Systems   Constitutional: Negative. HENT: Negative. Respiratory: Negative. Cardiovascular: Negative. Gastrointestinal: Negative. Musculoskeletal: Negative. Hematological: Positive for adenopathy. All other systems reviewed and are negative. Physical Exam  Vitals signs and nursing note reviewed. Constitutional:       General: He is not in acute distress. Appearance: Normal appearance. He is well-developed. HENT:      Head: Normocephalic and atraumatic. Right Ear: Tympanic membrane normal.      Left Ear: Tympanic membrane normal.   Eyes:      Conjunctiva/sclera: Conjunctivae normal.   Neck:      Musculoskeletal: Neck supple. Thyroid: No thyroid mass, thyromegaly or thyroid tenderness. Cardiovascular:      Rate and Rhythm: Normal rate and regular rhythm. Heart sounds: Normal heart sounds. No murmur. Pulmonary:      Effort: Pulmonary effort is normal.      Breath sounds: Normal breath sounds. No wheezing, rhonchi or rales. Abdominal:      General: There is no distension. Skin:     General: Skin is warm and dry. Findings: No rash (on exposed surfaces). Neurological:      General: No focal deficit present. Mental Status: He is alert. Psychiatric:         Attention and Perception: Attention normal.         Mood and Affect: Mood normal.         Speech: Speech normal.         Behavior: Behavior normal. Behavior is cooperative. Thought Content:  Thought content

## 2021-01-18 NOTE — PROGRESS NOTES
Patient scheduled for the CT of the neck on 1/21/21 at 4:10pm with an arrival time of 3:50pm.  The patient updated when discharged. He voiced understanding.

## 2021-01-21 ENCOUNTER — HOSPITAL ENCOUNTER (OUTPATIENT)
Dept: CT IMAGING | Age: 86
Discharge: HOME | End: 2021-01-21
Payer: MEDICARE

## 2021-01-21 DIAGNOSIS — R59.9 PALPABLE LYMPH NODE: ICD-10-CM

## 2021-01-21 PROCEDURE — 70490 CT SOFT TISSUE NECK W/O DYE: CPT

## 2021-01-22 ENCOUNTER — TELEPHONE (OUTPATIENT)
Dept: FAMILY MEDICINE CLINIC | Age: 86
End: 2021-01-22

## 2021-01-22 DIAGNOSIS — J22 LRTI (LOWER RESPIRATORY TRACT INFECTION): Primary | ICD-10-CM

## 2021-01-22 RX ORDER — AZITHROMYCIN 250 MG/1
250 TABLET, FILM COATED ORAL SEE ADMIN INSTRUCTIONS
Qty: 6 TABLET | Refills: 0 | Status: SHIPPED | OUTPATIENT
Start: 2021-01-22 | End: 2021-12-02 | Stop reason: SDUPTHER

## 2021-01-22 NOTE — TELEPHONE ENCOUNTER
See CT results from yesterday 1/21/21. Pt says he does have a productive cough and is concerned it will turn into pneumonia. He would like to get treatment now before it turns into something. Pt says his wife has limited mobility and if he is down with a sickness there would be no one to help her. Please advise.

## 2021-02-22 DIAGNOSIS — I10 ESSENTIAL HYPERTENSION: ICD-10-CM

## 2021-02-22 RX ORDER — IRBESARTAN 300 MG/1
TABLET ORAL
Qty: 90 TABLET | Refills: 3 | Status: SHIPPED | OUTPATIENT
Start: 2021-02-22 | End: 2022-02-28

## 2021-03-17 ENCOUNTER — OFFICE VISIT (OUTPATIENT)
Dept: FAMILY MEDICINE CLINIC | Age: 86
End: 2021-03-17
Payer: MEDICARE

## 2021-03-17 VITALS
HEIGHT: 71 IN | BODY MASS INDEX: 21.06 KG/M2 | RESPIRATION RATE: 16 BRPM | DIASTOLIC BLOOD PRESSURE: 70 MMHG | TEMPERATURE: 97.1 F | SYSTOLIC BLOOD PRESSURE: 116 MMHG | WEIGHT: 150.4 LBS | HEART RATE: 72 BPM

## 2021-03-17 DIAGNOSIS — Z91.09 ENVIRONMENTAL ALLERGIES: ICD-10-CM

## 2021-03-17 DIAGNOSIS — Z00.00 ROUTINE GENERAL MEDICAL EXAMINATION AT A HEALTH CARE FACILITY: Primary | ICD-10-CM

## 2021-03-17 DIAGNOSIS — R73.01 IFG (IMPAIRED FASTING GLUCOSE): ICD-10-CM

## 2021-03-17 DIAGNOSIS — I10 ESSENTIAL HYPERTENSION: ICD-10-CM

## 2021-03-17 DIAGNOSIS — R05.3 PERSISTENT COUGH FOR 3 WEEKS OR LONGER: ICD-10-CM

## 2021-03-17 DIAGNOSIS — E78.5 HYPERLIPIDEMIA WITH TARGET LDL LESS THAN 100: ICD-10-CM

## 2021-03-17 DIAGNOSIS — C61 ADENOCARCINOMA OF PROSTATE (HCC): ICD-10-CM

## 2021-03-17 PROCEDURE — 1123F ACP DISCUSS/DSCN MKR DOCD: CPT | Performed by: FAMILY MEDICINE

## 2021-03-17 PROCEDURE — 4040F PNEUMOC VAC/ADMIN/RCVD: CPT | Performed by: FAMILY MEDICINE

## 2021-03-17 PROCEDURE — G8484 FLU IMMUNIZE NO ADMIN: HCPCS | Performed by: FAMILY MEDICINE

## 2021-03-17 PROCEDURE — G0439 PPPS, SUBSEQ VISIT: HCPCS | Performed by: FAMILY MEDICINE

## 2021-03-17 ASSESSMENT — ENCOUNTER SYMPTOMS
RESPIRATORY NEGATIVE: 1
GASTROINTESTINAL NEGATIVE: 1

## 2021-03-17 ASSESSMENT — PATIENT HEALTH QUESTIONNAIRE - PHQ9
1. LITTLE INTEREST OR PLEASURE IN DOING THINGS: 0
2. FEELING DOWN, DEPRESSED OR HOPELESS: 0
SUM OF ALL RESPONSES TO PHQ QUESTIONS 1-9: 0
SUM OF ALL RESPONSES TO PHQ9 QUESTIONS 1 & 2: 0

## 2021-03-17 NOTE — PROGRESS NOTES
Chronic Disease Visit Information    BP Readings from Last 3 Encounters:   01/18/21 122/86   12/02/20 120/60   09/09/20 (!) 140/78          Hemoglobin A1C (%)   Date Value   10/30/2020 5.5   11/03/2017 5.4   06/03/2016 5.6     LDL Calculated (mg/dL)   Date Value   12/03/2020 76     HDL (mg/dL)   Date Value   12/03/2020 52     BUN (mg/dL)   Date Value   12/03/2020 10     CREATININE (mg/dL)   Date Value   12/03/2020 0.8     Glucose (mg/dL)   Date Value   12/03/2020 108   03/03/2015 106            Have you changed or started any medications since your last visit including any over-the-counter medicines, vitamins, or herbal medicines? no   Are you having any side effects from any of your medications? -  no  Have you stopped taking any of your medications? Is so, why? -  no    Have you seen any other physician or provider since your last visit? No  Have you had any other diagnostic tests since your last visit? No  Have you been seen in the emergency room and/or had an admission to a hospital since we last saw you? No  Have you had your annual diabetic retinal (eye) exam? Yes - Records Requested  Have you had your routine dental cleaning in the past 6 months? no    Have you activated your [employer] account? If not, what are your barriers?  No: pt choice     Patient Care Team:  Fidelia Fong DO as PCP - General (Family Medicine)  Fidel[facility] Fong DO [facility]  Mickie Childers MD as Consulting Physician (Urology)  Clifton Michaels MD as Consulting Physician (Cardiology)  Danny Brooks MD (Radiation Oncology)         Medical History Review  Past Medical, Family, and Social History reviewed and does contribute to the patient presenting condition    Health Maintenance   Topic Date Due    DTaP/Tdap/Td vaccine (1 - Tdap) 02/08/1953    Shingles Vaccine (1 of 2) Never done    Annual Wellness Visit (AWV)  Never done    PSA counseling  10/30/2021    Lipid screen  12/03/2021    Potassium monitoring  12/03/2021    Creatinine monitoring  12/03/2021    Flu vaccine  Completed    Pneumococcal 65+ years Vaccine  Completed    COVID-19 Vaccine  Completed    Hepatitis A vaccine  Aged Out    Hepatitis B vaccine  Aged Out    Hib vaccine  Aged Out    Meningococcal (ACWY) vaccine  Aged Out

## 2021-03-17 NOTE — PROGRESS NOTES
3/17/2021    Cristy Adames (:  1934) is a 80 y.o. male, here for a preventive medicine evaluation. Chief Complaint   Patient presents with   [employer]un[employer] Medicare AWV     AWV. Doing well overall. BP well controlled. BP Readings from Last 3 Encounters:   21 116/70   21 122/86   20 120/60     Weight stable. Wt Readings from Last 3 Encounters:   21 150 lb 6.4 oz (68.2 kg)   21 151 lb 9.6 oz (68.8 kg)   20 152 lb 4.8 oz (69.1 kg)     Recently seen by Cardio, no changes. Labs reviewed, look fine. Patient Active Problem List   Diagnosis    S/P colonoscopy    HTN (hypertension)    Hyperlipidemia with target LDL less than 100    History of prostate cancer    Elevated PSA    S/P prostatectomy    Adenocarcinoma of prostate ([facility] [address]       Review of Systems   Constitutional: Negative. HENT: Negative. Respiratory: Negative. Cardiovascular: Negative. Gastrointestinal: Negative. Musculoskeletal: Negative. All other systems reviewed and are negative. Prior to Visit Medications    Medication Sig Taking? Authorizing Provider   irbesartan (AVAPRO) 300 MG tablet TAKE 1 TABLET BY MOUTH EVERY DAY Yes Rosie Morales, DO   CRANBERRY PO Take by mouth daily Yes Historical Provider, MD   ipratropium (ATROVENT) 0.03 % nasal spray SPRAY 2 SPRAYS BY NASAL ROUTE EVERY 12 HOURS Yes Rosie Morales DO   amLODIPine (NORVASC) 5 MG tablet Take 1 tablet by mouth daily Yes Rosie Morales DO   benazepril (LOTENSIN) 10 MG tablet Take 1 tablet by mouth daily Yes Rosie Morales DO   simvastatin (ZOCOR) 20 MG tablet TAKE 1 TABLET BY MOUTH NIGHTLY Yes Rosie Morales DO   aspirin 81 MG tablet Take 81 mg by mouth daily  Yes Historical Provider, MD   Multiple Vitamins-Minerals (MULTIVITAMIN PO) Take 1 tablet by mouth daily. Yes Historical Provider, MD   b complex vitamins capsule Take 1 capsule by mouth daily.  Yes Historical Provider, MD   Omega 3 1000 MG CAPS Take 1 tablet by mouth daily. Yes Historical Provider, MD   Vitamin D (CHOLECALCIFEROL) 1000 UNITS CAPS capsule Take 1,000 Units by mouth daily.  Yes Historical Provider, MD        Allergies   Allergen Reactions    Pcn [Penicillins] Hives    Tetracyclines & Related Other (See Comments)     BLISTERS WHEN URINATING       Past Medical History:   Diagnosis Date    Hyperlipidemia     Hypertension     Prostate cancer ([facility] [address]     45 rounds of radiation for prostate Ca       Past Surgical History:   Procedure Laterality Date    COLONOSCOPY  13    Dr. Esteban Crane Right     [place] Left 1's     INGUINAL HERNIA REPAIR Right 1's     [place] COLON CA SCRN NOT  [address] IND Left 2018    COLONOSCOPY performed by Pierre Sawyer MD at [address]       Social History     Socioeconomic History    Marital status:      Spouse name: Devin Andrea Number of children: 1    Years of education: 13    Highest education level: Associate degree: occupational, technical, or vocational program   Occupational History    Occupation: retired    Social Needs    Financial resource strain: Not hard at all   [employer] insecurity     Worry: Never true     Inability: Never true   [employer] needs     Medical: No     Non-medical: No   Tobacco Use    Smoking status: Former Smoker     Packs/day: 1.00     Years: 20.00     Pack years: 20.00     Types: Cigarettes     Quit date: 1992     Years since quittin.2    Smokeless tobacco: Never Used   Substance and Sexual Activity    Alcohol use: No     Comment: Quit about 15 years ago     Drug use: No    Sexual activity: Not on file   Lifestyle    Physical activity     Days per week: Not on file     Minutes per session: Not on file    Stress: Not on file   Relationships    Social connections     Talks on phone: Not on file     Gets together: Not on file     Attends Faith service: Not on file     Active member of club or organization: Not on file     Attends meetings of clubs or organizations: Not on file     Relationship status: Not on file    Intimate partner violence     Fear of current or ex partner: Not on file     Emotionally abused: Not on file     Physically abused: Not on file     Forced sexual activity: Not on file   Other Topics Concern    Not on file   Social History Narrative    Not on file        Family History   Problem Relation Age of Onset    Stroke Mother     Arthritis Mother     Heart Attack Father     Heart Disease Father        ADVANCE DIRECTIVE: N, <no information>    Vitals:    03/17/21 0931   BP: 116/70   Site: Left Upper Arm   Position: Sitting   Cuff Size: Small Adult   Pulse: 72   Resp: 16   Temp: 97.1 °F (36.2 °C)   TempSrc: Temporal   Weight: 150 lb 6.4 oz (68.2 kg)   Height: 5' 11\" (1.803 m)     Estimated body mass index is 20.98 kg/m² as calculated from the following:    Height as of this encounter: 5' 11\" (1.803 m). Weight as of this encounter: 150 lb 6.4 oz (68.2 kg). Physical Exam  Vitals signs and nursing note reviewed. Constitutional:       General: He is not in acute distress. Appearance: Normal appearance. He is well-developed. HENT:      Head: Normocephalic and atraumatic. Right Ear: Tympanic membrane normal.      Left Ear: Tympanic membrane normal.   Eyes:      Conjunctiva/sclera: Conjunctivae normal.   Neck:      Musculoskeletal: Neck supple. Cardiovascular:      Rate and Rhythm: Normal rate and regular rhythm. Heart sounds: Normal heart sounds. No murmur. Pulmonary:      Effort: Pulmonary effort is normal.      Breath sounds: Normal breath sounds. No wheezing, rhonchi or rales. Abdominal:      General: There is no distension. Skin:     General: Skin is warm and dry. Findings: No rash (on exposed surfaces). Neurological:      General: No focal deficit present. Mental Status: He is alert. Psychiatric:         Attention and Perception: Attention normal.         Mood and Affect: Mood normal.         Speech: Speech normal.         Behavior: Behavior normal. Behavior is cooperative. Thought Content: Thought content normal.         Judgment: Judgment normal.         No flowsheet data found. Lab Results   Component Value Date    CHOL 150 12/03/2020    CHOL 188 10/30/2020    CHOL 149 11/20/2018    TRIG 108 12/03/2020    TRIG 105 10/30/2020    TRIG 85 11/20/2018    HDL 52 12/03/2020    HDL 55 10/30/2020    HDL 53 11/20/2018    LDLCALC 76 12/03/2020    LDLCALC 112 10/30/2020    LDLCALC 79 11/20/2018    GLUCOSE 108 12/03/2020    GLUCOSE 106 03/03/2015    LABA1C 5.5 10/30/2020    LABA1C 5.4 11/03/2017    LABA1C 5.6 06/03/2016       The ASCVD Risk score (Cyndy Emerson, et al., 2013) failed to calculate for the following reasons:     The 2013 ASCVD risk score is only valid for ages 36 to 78    Immunization History   Administered Date(s) Administered    COVID-19, Moderna, PF, 100mcg/0.5mL 01/20/2021, 02/26/2021    Influenza Vaccine, unspecified formulation 11/23/2015    Influenza, High Dose (Fluzone 65 yrs and older) 10/08/2018    Influenza, Bozena Cipro, IM, (6 mo and older Fluzone, Flulaval, Fluarix and 3 yrs and older Afluria) 11/29/2016, 09/13/2017    Influenza, Bzoena Cipro, adjuvanted, 65 yrs +, IM, PF (Fluad) 10/12/2020    Influenza, Triv, inactivated, subunit, adjuvanted, IM (Fluad 65 yrs and older) 10/02/2019    Pneumococcal Conjugate 13-valent ([id number]) 09/11/2015    Pneumococcal Polysaccharide ([id number]) 02/12/2018    Td, unspecified formulation 03/29/2017       Health Maintenance   Topic Date Due    DTaP/Tdap/Td vaccine (1 - Tdap) 02/08/1953    Shingles Vaccine (1 of 2) Never done   [employer] Visit (AWV)  Never done    PSA counseling  10/30/2021    Lipid screen  12/03/2021    Potassium monitoring  12/03/2021    Creatinine monitoring  12/03/2021    Flu vaccine  Completed    Pneumococcal 65+ years Vaccine  Completed    COVID-19 Vaccine  Completed    Hepatitis A vaccine  Aged Out    Hepatitis B vaccine  Aged Out    Hib vaccine  Aged Out    Meningococcal (ACWY) vaccine  Aged Out       ASSESSMENT/PLAN:  1. Routine general medical examination at a health care facility  2. Essential hypertension  3. Hyperlipidemia with target LDL less than 100  4. Persistent cough for 3 weeks or longer  5. Environmental allergies  6. IFG (impaired fasting glucose)  7. Adenocarcinoma of prostate [facility])    -  Chronic medical problems stable  -  Continue current medications  -  Follow up with specialists as scheduled  -  Labs in December reviewed, look great      Return for Medicare Annual Wellness Visit in 1 year. An electronic signature was used to authenticate this note. --Lanie Ness DO on 3/17/2021 at 9:43 AM    Medicare Annual Wellness Visit  Name: Nanette Cuevas Date: 3/17/2021   MRN: [id number] Sex: Male   Age: 80 y.o. Ethnicity: Non-/Non    : 1934 Race: Madi Vela is here for Medicare AWV    Screenings for behavioral, psychosocial and functional/safety risks, and cognitive dysfunction are all negative except as indicated below. These results, as well as other patient data from the [address] form, are documented in Flowsheets linked to this Encounter. Allergies   Allergen Reactions    Pcn [Penicillins] Hives    Tetracyclines & Related Other (See Comments)     BLISTERS WHEN URINATING         Prior to Visit Medications    Medication Sig Taking?  Authorizing Provider   irbesartan (AVAPRO) 300 MG tablet TAKE 1 TABLET BY MOUTH EVERY DAY Yes Lanie Ness DO   CRANBERRY PO Take by mouth daily Yes Historical Provider, MD   ipratropium (ATROVENT) 0.03 % nasal spray SPRAY 2 SPRAYS BY NASAL ROUTE EVERY 12 HOURS Yes Lanie Ness DO   amLODIPine (NORVASC) 5 MG tablet Take 1 tablet by mouth daily Yes Pk Luna DO   benazepril (LOTENSIN) 10 MG tablet Take 1 tablet by mouth daily Yes Pk Luna DO   simvastatin (ZOCOR) 20 MG tablet TAKE 1 TABLET BY MOUTH NIGHTLY Yes Pk Luna DO   aspirin 81 MG tablet Take 81 mg by mouth daily  Yes Historical Provider, MD   Multiple Vitamins-Minerals (MULTIVITAMIN PO) Take 1 tablet by mouth daily. Yes Historical Provider, MD   b complex vitamins capsule Take 1 capsule by mouth daily. Yes Historical Provider, MD   Omega 3 1000 MG CAPS Take 1 tablet by mouth daily. Yes Historical Provider, MD   Vitamin D (CHOLECALCIFEROL) 1000 UNITS CAPS capsule Take 1,000 Units by mouth daily.  Yes Historical Provider, MD         Past Medical History:   Diagnosis Date    Hyperlipidemia     Hypertension     Prostate cancer ([facility] [address]     45 rounds of radiation for prostate Ca       Past Surgical History:   Procedure Laterality Date    COLONOSCOPY  9-19-13    Dr. Lucrecia Boyle Right 1990's     INGUINAL HERNIA REPAIR Left 1990's     INGUINAL HERNIA REPAIR Right 1990's     [place] COLON [place] SCRN NOT  [address] IND Left 11/8/2018    COLONOSCOPY performed by Sarah Hummel MD at [address]         Family History   Problem Relation Age of Onset    Stroke Mother     Arthritis Mother     Heart Attack Father     Heart Disease Father        CareTeam (Including outside providers/suppliers regularly involved in providing care):   Patient Care Team:  Pk Luna DO as PCP - General (Family Medicine)  Pk Luna DO as PCP - [facility] Empaneled Provider  Dominic Alvarado MD as Consulting Physician (Urology)  Chrissy Snow MD as Consulting Physician (Cardiology)  Yolanda Miller MD (Radiation Oncology)    Wt Readings from Last 3 Encounters:   03/17/21 150 lb 6.4 oz (68.2 kg)   01/18/21 151 lb 9.6 oz (68.8 kg)   12/02/20 152 lb 4.8 oz (69.1 kg)     Vitals: 03/17/21 0931   BP: 116/70   Site: Left Upper Arm   Position: Sitting   Cuff Size: Small Adult   Pulse: 72   Resp: 16   Temp: 97.1 °F (36.2 °C)   TempSrc: Temporal   Weight: 150 lb 6.4 oz (68.2 kg)   Height: 5' 11\" (1.803 m)     Body mass index is 20.98 kg/m². Based upon direct observation of the patient, evaluation of cognition reveals recent and remote memory intact. Patient's complete Health Risk Assessment and screening values have been reviewed and are found in Flowsheets. The following problems were reviewed today and where indicated follow up appointments were made and/or referrals ordered. Positive Risk Factor Screenings with Interventions:            General Health and ACP:  General  In general, how would you say your health is?: Very Good  In the past 7 days, have you experienced any of the following?  New or Increased Pain, New or Increased Fatigue, Loneliness, Social Isolation, Stress or Anger?: None of These  Do you get the social and emotional support that you need?: Yes  Do you have a Living Will?: Yes  Advance Directives     Power of [address] [place] [address] Will ACP-Advance Directive ACP-Power of     Not on File Not on File Not on File Not on File      General Health Risk Interventions:  · NA    Health Habits/Nutrition:  Health Habits/Nutrition  Do you exercise for at least 20 minutes 2-3 times per week?: (!) No  Have you lost any weight without trying in the past 3 months?: (!) Yes  Do you eat only one meal per day?: No  Have you seen the dentist within the past year?: N/A - wear dentures  Body mass index: 20.97  Health Habits/Nutrition Interventions:  · Inadequate physical activity:  patient is not ready to increase his/her physical activity level at this time  · Nutritional issues:  educational materials for healthy, well-balanced diet provided     Safety:  Safety  Do you have working smoke detectors?: Yes  Have all throw rugs been removed or fastened?: (!) No  Do you have non-slip mats or surfaces in all bathtubs/showers?: Yes  Do all of your stairways have a railing or banister?: Yes  Are your doorways, halls and stairs free of clutter?: Yes  Do you always fasten your seatbelt when you are in a car?: Yes  Safety Interventions:  · Home safety tips provided    ADL:  ADLs  In the past 7 days, did you need help from others to perform any of the following everyday activities? Eating, dressing, grooming, bathing, toileting, or walking/balance?: None  In the past 7 days, did you need help from others to take care of any of the following?  Laundry, housekeeping, banking/finances, shopping, telephone use, food preparation, transportation, or taking medications?: (!) Food Preparation  ADL Interventions:  · Patient declines any further evaluation/treatment for this issue    Personalized Preventive Plan   Current Health Maintenance Status  Immunization History   Administered Date(s) Administered    COVID-19, Moderna, PF, 100mcg/0.5mL 01/20/2021, 02/26/2021    Influenza Vaccine, unspecified formulation 11/23/2015    Influenza, High Dose (Fluzone 65 yrs and older) 10/08/2018    Influenza, Quadv, IM, (6 mo and older Fluzone, Flulaval, Fluarix and 3 yrs and older Afluria) 11/29/2016, 09/13/2017    Influenza, Quadv, adjuvanted, 65 yrs +, IM, PF (Fluad) 10/12/2020    Influenza, Triv, inactivated, subunit, adjuvanted, IM (Fluad 65 yrs and older) 10/02/2019    Pneumococcal Conjugate 13-valent ([id number]) 09/11/2015    Pneumococcal Polysaccharide ([id number]) 02/12/2018    Td, unspecified formulation 03/29/2017        Health Maintenance   Topic Date Due    DTaP/Tdap/Td vaccine (1 - Tdap) 02/08/1953    Shingles Vaccine (1 of 2) Never done   [employer] Visit (AWV)  Never done    PSA counseling  10/30/2021    Lipid screen  12/03/2021    Potassium monitoring  12/03/2021    Creatinine monitoring  12/03/2021    Flu vaccine  Completed    Pneumococcal 65+ years Vaccine  Completed    COVID-19

## 2021-04-21 DIAGNOSIS — E78.5 HYPERLIPIDEMIA WITH TARGET LDL LESS THAN 100: ICD-10-CM

## 2021-04-21 RX ORDER — SIMVASTATIN 20 MG
TABLET ORAL
Qty: 90 TABLET | Refills: 3 | Status: SHIPPED | OUTPATIENT
Start: 2021-04-21 | End: 2022-04-28

## 2021-05-29 DIAGNOSIS — J40 BRONCHITIS: ICD-10-CM

## 2021-06-01 ENCOUNTER — HOSPITAL ENCOUNTER (OUTPATIENT)
Age: 86
Discharge: HOME | End: 2021-06-01
Payer: MEDICARE

## 2021-06-01 DIAGNOSIS — C61 ADENOCARCINOMA OF PROSTATE (HCC): ICD-10-CM

## 2021-06-01 DIAGNOSIS — I10 ESSENTIAL HYPERTENSION: ICD-10-CM

## 2021-06-01 PROCEDURE — 84153 ASSAY OF PSA TOTAL: CPT

## 2021-06-01 PROCEDURE — 36415 COLL VENOUS BLD VENIPUNCTURE: CPT

## 2021-06-01 RX ORDER — PREDNISONE 10 MG/1
TABLET ORAL
Qty: 20 TABLET | Refills: 0 | Status: SHIPPED | OUTPATIENT
Start: 2021-06-01

## 2021-06-01 RX ORDER — BENAZEPRIL HYDROCHLORIDE 10 MG/1
TABLET ORAL
Qty: 90 TABLET | Refills: 3 | Status: SHIPPED | OUTPATIENT
Start: 2021-06-01 | End: 2022-06-04

## 2021-06-01 NOTE — TELEPHONE ENCOUNTER
Requested Prescriptions     Pending Prescriptions Disp Refills    benazepril (LOTENSIN) 10 MG tablet 90 tablet 3     Sig: TAKE 1 TABLET BY MOUTH EVERY DAY       [facility] [address] did NOT received first script sent 5/27/21. Thanks !

## 2021-06-02 LAB — PROSTATE SPECIFIC ANTIGEN: < 0.02 NG/ML (ref 0–1)

## 2021-06-04 DIAGNOSIS — I10 ESSENTIAL HYPERTENSION: ICD-10-CM

## 2021-06-05 RX ORDER — AMLODIPINE BESYLATE 5 MG/1
TABLET ORAL
Qty: 90 TABLET | Refills: 3 | Status: SHIPPED | OUTPATIENT
Start: 2021-06-05 | End: 2022-06-23

## 2021-06-15 ENCOUNTER — HOSPITAL ENCOUNTER (OUTPATIENT)
Dept: RADIATION ONCOLOGY | Age: 86
Discharge: HOME | End: 2021-06-15
Payer: MEDICARE

## 2021-06-15 VITALS
WEIGHT: 152.4 LBS | SYSTOLIC BLOOD PRESSURE: 140 MMHG | OXYGEN SATURATION: 96 % | BODY MASS INDEX: 21.26 KG/M2 | RESPIRATION RATE: 16 BRPM | HEART RATE: 72 BPM | TEMPERATURE: 98.1 F | DIASTOLIC BLOOD PRESSURE: 64 MMHG

## 2021-06-15 PROCEDURE — 99212 OFFICE O/P EST SF 10 MIN: CPT | Performed by: RADIOLOGY

## 2021-06-15 NOTE — PROGRESS NOTES
[facility] OF [facility])         [address], [address]        [facility], [facility]        Christ Javier: [phone]        F: [phone]       [email or web address]          FOLLOW UP    Date of Service: 6/15/2021  Patient ID: Alma Delia Rowan   : 1934  MRN: [id number]   Acct Number: [de-identified]       DATE OF SERVICE: 6/15/2021   LOCATION: [facility]  PROVIDER: Nusrat Garcia MD    FOLLOW UP PHYSICIANS: Rosendo Alvarado DO    ASSESSMENT AND PLAN:  Cancer Staging  Adenocarcinoma of prostate [facility])  Staging form: Prostate, AJCC 8th Edition  - Pathologic stage from 1999: Stage IIB (ypT2, pN0, cM0, PSA: 9.3, Grade Group: 2) - Signed by Adarsh Ramos MD on 2018  - Clinical: No stage assigned - Unsigned      -Mr. Hernán Ronquillo presents today doing well overall with no new general complaints  -He denies any new /GI dysfunction, and continues doing well s/p salvage radiation therapy  -His most recent PSA value on 21 remains undetectable, <0.02  -Patient would like to continue regular PSA labs every 6 months  -Plan for repeat PSA prior to next visit.  -Continues regular follow up with Urology  -Recommend continued follow up with PCP for pulmonary complaints    The patient will return to clinic in 12 months or sooner if clinically indicated. They have our clinic number to call with any questions or concerns if needed. Thank you for allowing us to be a part of their care. HPI:  Beryle Moh underwent prostate biopsy in 1999 which returned positive for adenocarcinoma. He subsequently underwent radical prostatectomy at [facility] in [place] [address].  Surgical pathology showed a Vian 3 + 4 = 7, occupying about 28 percent of the volume  of the prostate.  Seminal vesicles and margins were negative, although there was a close margin at 1 mm.  There was no perineural invasion reported. The patient was felt to have a complete resection and did not receive a recommendation for any adjuvant treatment after his surgery.  He then was followed with routine PSA monitoring. Approximately 5-6 years after surgery his PSA slowly began to increase. In 2015 PSA had increased to 0.4 and he had restaging scans which did not show evidence of metastatic disease. He was referred to radiation oncology to discuss role of salvage radiation. Wendy Jaramillo was agreeable to undergoing radiation and did not experience any unexpected side effects from the treatment.     INTERVAL HISTORY:  Essie Guzman is a 80 y.o. male is presenting today for regularly scheduled follow up regarding the above mentioned oncologic history. No significant  complaints, denies pain or blood with urination. Good GI function, denies pain or blood with bowel movements, regular movements, with well formed stools. Denies any new bone pains, chronic pain, stable. Has respiratory complaints of intermittent cough productive or clear/yellow sputum, that he relates to sinus congestion, intermittent. Denies chest pain or discomfort. PCP following well. No other general complaints at this time. CHAPERONE: na    ROS:  A complete review of systems was performed and found to be negative except as presented above.     LABORATORY STUDIES:    Onc labs:   Lab Results   Component Value Date    PSA <0.02 06/01/2021       MEDICATIONS:   Current Outpatient Medications   Medication Sig Dispense Refill    amLODIPine (NORVASC) 5 MG tablet TAKE 1 TABLET BY MOUTH EVERY DAY 90 tablet 3    predniSONE (DELTASONE) 10 MG tablet TAKE 2 TABLETS BY MOUTH TWICE A DAY FOR 5 DAYS 20 tablet 0    benazepril (LOTENSIN) 10 MG tablet TAKE 1 TABLET BY MOUTH EVERY DAY 90 tablet 3    simvastatin (ZOCOR) 20 MG tablet TAKE 1 TABLET BY MOUTH EVERY DAY AT NIGHT 90 tablet 3    irbesartan (AVAPRO) 300 MG tablet TAKE 1 TABLET BY MOUTH EVERY DAY 90 tablet 3    CRANBERRY PO Take by mouth daily      ipratropium (ATROVENT) 0.03 % nasal spray SPRAY 2 SPRAYS BY NASAL ROUTE EVERY 12 HOURS 1 Bottle 1    aspirin 81 MG tablet Take 81 mg by mouth daily       Multiple Vitamins-Minerals (MULTIVITAMIN PO) Take 1 tablet by mouth daily.  b complex vitamins capsule Take 1 capsule by mouth daily.  Omega 3 1000 MG CAPS Take 1 tablet by mouth daily.  Vitamin D (CHOLECALCIFEROL) 1000 UNITS CAPS capsule Take 1,000 Units by mouth daily. No current facility-administered medications for this encounter. EXAMINATION:  VITAL SIGNS: BP (!) 140/64   Pulse 72   Temp 98.1 °F (36.7 °C) (Infrared)   Resp 16   Wt 152 lb 6.4 oz (69.1 kg)   SpO2 96%   BMI 21.26 kg/m²     ECO - Asymptomatic (Fully active, able to carry on all pre-disease activities without restriction)    PAIN: 0/10    GENERAL: Pleasant well-developed adult. In no acute distress. Alert and oriented ×3  HEENT: Normocephalic, atraumatic. LUNGS: clear but distant breath sounds bilaterally. Good inspiratory effort, no accessory muscle use. CARDIAC: Regular rate and rhythm  ABDOMEN: Soft, nontender, nondistended  EXTREMITIES: No significant LE edema b/l, no palpable tender along spinal axis/bilateral hips  NEUROLOGIC: No grossly apparent focal deficits. Cranial nerves grossly intact    Electronically signed by Kallie Tejeda MD on 6/15/21 at 3:35 PM EDT     ATTESTATION: 20 minutes were spent with the patient at today's visit reviewing pertinent information related to their oncologic diagnosis, including any recent labs, imaging, follow ups and plan of care going forward.     CC: Heather Mustafa DO  ACC:[facility]

## 2021-06-17 DIAGNOSIS — C61 ADENOCARCINOMA OF PROSTATE (HCC): Primary | ICD-10-CM

## 2021-11-18 ENCOUNTER — HOSPITAL ENCOUNTER (OUTPATIENT)
Age: 86
Discharge: HOME | End: 2021-11-18
Payer: MEDICARE

## 2021-11-18 DIAGNOSIS — C61 ADENOCARCINOMA OF PROSTATE (HCC): ICD-10-CM

## 2021-11-18 LAB — PROSTATE SPECIFIC ANTIGEN: < 0.02 NG/ML (ref 0–1)

## 2021-11-18 PROCEDURE — 36415 COLL VENOUS BLD VENIPUNCTURE: CPT

## 2021-11-18 PROCEDURE — 84153 ASSAY OF PSA TOTAL: CPT

## 2021-12-02 ENCOUNTER — TELEPHONE (OUTPATIENT)
Dept: FAMILY MEDICINE CLINIC | Age: 86
End: 2021-12-02

## 2021-12-02 DIAGNOSIS — J22 LRTI (LOWER RESPIRATORY TRACT INFECTION): ICD-10-CM

## 2021-12-02 RX ORDER — AZITHROMYCIN 250 MG/1
250 TABLET, FILM COATED ORAL SEE ADMIN INSTRUCTIONS
Qty: 6 TABLET | Refills: 0 | Status: SHIPPED | OUTPATIENT
Start: 2021-12-02 | End: 2021-12-21 | Stop reason: SDUPTHER

## 2021-12-02 NOTE — TELEPHONE ENCOUNTER
----- Message from Manuel Milner sent at 12/2/2021 10:32 AM EST -----  Subject: Appointment Request    Reason for Call: Semi-Routine Cough, Cold Symptoms    QUESTIONS  Type of Appointment? Established Patient  Reason for appointment request? Available appointments did not meet   patient need  Additional Information for Provider? Spoke with patient, he is not happy   about doing a VV appointment he is having issues with his sinus. Please   return his call to assist.   ---------------------------------------------------------------------------  --------------  CALL BACK INFO  What is the best way for the office to contact you? OK to leave message on   voicemail  Preferred Call Back Phone Number? [phone]  ---------------------------------------------------------------------------  --------------  SCRIPT ANSWERS  Relationship to Patient? Self  Are you currently unable to finish sentences due to any difficulty   breathing? No  Are you unable to swallow liquids? No  Are you having fevers (100.4 or greater), chills, or sweats? No  Do you have COPD, asthma or a chronic lung condition? No  Have your symptoms been present for more than 5 days? No  Have you recently (14 days) been seen by a provider for this issue? No  Have you been diagnosed with, awaiting test results for, or told that you   are suspected of having COVID-19 (Coronavirus)? (If patient has tested   negative or was tested as a requirement for work, school, or travel and   not based on symptoms, answer no)? No  Within the past two weeks have you developed any of the following symptoms   (answer no if symptoms have been present longer than 2 weeks or began   more than 2 weeks ago)? Fever or Chills, Cough, Shortness of breath or   difficulty breathing, Loss of taste or smell, Sore throat, Nasal   congestion, Sneezing or runny nose, Fatigue or generalized body aches   (answer no if pain is specific to a body part e.g. back pain), Diarrhea,   Headache? Yes

## 2021-12-02 NOTE — TELEPHONE ENCOUNTER
11/25/21 symptoms of productive cough yellow drainage, nasal/chest congestion, facial sinus pain. DENIES fever, SOB, BA, headache, sore throat, N/V, diarrhea or ear pain. Pharmacy [facility]. Patient would like a call back.

## 2021-12-21 ENCOUNTER — OFFICE VISIT (OUTPATIENT)
Dept: FAMILY MEDICINE CLINIC | Age: 86
End: 2021-12-21
Payer: MEDICARE

## 2021-12-21 VITALS
SYSTOLIC BLOOD PRESSURE: 124 MMHG | BODY MASS INDEX: 20.43 KG/M2 | RESPIRATION RATE: 12 BRPM | WEIGHT: 146.5 LBS | DIASTOLIC BLOOD PRESSURE: 76 MMHG | HEART RATE: 68 BPM

## 2021-12-21 DIAGNOSIS — J22 LRTI (LOWER RESPIRATORY TRACT INFECTION): Primary | ICD-10-CM

## 2021-12-21 DIAGNOSIS — Z91.09 ENVIRONMENTAL ALLERGIES: ICD-10-CM

## 2021-12-21 DIAGNOSIS — E78.5 HYPERLIPIDEMIA WITH TARGET LDL LESS THAN 100: ICD-10-CM

## 2021-12-21 DIAGNOSIS — J01.90 ACUTE RHINOSINUSITIS: ICD-10-CM

## 2021-12-21 DIAGNOSIS — R05.3 PERSISTENT COUGH FOR 3 WEEKS OR LONGER: ICD-10-CM

## 2021-12-21 DIAGNOSIS — I10 ESSENTIAL HYPERTENSION: ICD-10-CM

## 2021-12-21 DIAGNOSIS — R73.01 IFG (IMPAIRED FASTING GLUCOSE): ICD-10-CM

## 2021-12-21 DIAGNOSIS — F43.21 GRIEVING: ICD-10-CM

## 2021-12-21 DIAGNOSIS — Z23 NEED FOR INFLUENZA VACCINATION: ICD-10-CM

## 2021-12-21 PROCEDURE — 90694 VACC AIIV4 NO PRSRV 0.5ML IM: CPT | Performed by: FAMILY MEDICINE

## 2021-12-21 PROCEDURE — 1123F ACP DISCUSS/DSCN MKR DOCD: CPT | Performed by: FAMILY MEDICINE

## 2021-12-21 PROCEDURE — G8427 DOCREV CUR MEDS BY ELIG CLIN: HCPCS | Performed by: FAMILY MEDICINE

## 2021-12-21 PROCEDURE — G0008 ADMIN INFLUENZA VIRUS VAC: HCPCS | Performed by: FAMILY MEDICINE

## 2021-12-21 PROCEDURE — G8484 FLU IMMUNIZE NO ADMIN: HCPCS | Performed by: FAMILY MEDICINE

## 2021-12-21 PROCEDURE — 1036F TOBACCO NON-USER: CPT | Performed by: FAMILY MEDICINE

## 2021-12-21 PROCEDURE — G8420 CALC BMI NORM PARAMETERS: HCPCS | Performed by: FAMILY MEDICINE

## 2021-12-21 PROCEDURE — 99214 OFFICE O/P EST MOD 30 MIN: CPT | Performed by: FAMILY MEDICINE

## 2021-12-21 PROCEDURE — 4040F PNEUMOC VAC/ADMIN/RCVD: CPT | Performed by: FAMILY MEDICINE

## 2021-12-21 RX ORDER — AZITHROMYCIN 250 MG/1
250 TABLET, FILM COATED ORAL SEE ADMIN INSTRUCTIONS
Qty: 6 TABLET | Refills: 0 | Status: SHIPPED | OUTPATIENT
Start: 2021-12-21 | End: 2021-12-26

## 2021-12-21 SDOH — ECONOMIC STABILITY: FOOD INSECURITY: WITHIN THE PAST 12 MONTHS, THE FOOD YOU BOUGHT JUST DIDN'T LAST AND YOU DIDN'T HAVE MONEY TO GET MORE.: NEVER TRUE

## 2021-12-21 SDOH — ECONOMIC STABILITY: FOOD INSECURITY: WITHIN THE PAST 12 MONTHS, YOU WORRIED THAT YOUR FOOD WOULD RUN OUT BEFORE YOU GOT MONEY TO BUY MORE.: NEVER TRUE

## 2021-12-21 ASSESSMENT — ENCOUNTER SYMPTOMS
CHEST TIGHTNESS: 0
GASTROINTESTINAL NEGATIVE: 1
SHORTNESS OF BREATH: 0
RHINORRHEA: 1
COUGH: 1
WHEEZING: 0

## 2021-12-21 ASSESSMENT — SOCIAL DETERMINANTS OF HEALTH (SDOH): HOW HARD IS IT FOR YOU TO PAY FOR THE VERY BASICS LIKE FOOD, HOUSING, MEDICAL CARE, AND HEATING?: NOT HARD AT ALL

## 2021-12-21 ASSESSMENT — PATIENT HEALTH QUESTIONNAIRE - PHQ9
2. FEELING DOWN, DEPRESSED OR HOPELESS: 1
1. LITTLE INTEREST OR PLEASURE IN DOING THINGS: 1
SUM OF ALL RESPONSES TO PHQ QUESTIONS 1-9: 2
SUM OF ALL RESPONSES TO PHQ QUESTIONS 1-9: 2
SUM OF ALL RESPONSES TO PHQ9 QUESTIONS 1 & 2: 2
SUM OF ALL RESPONSES TO PHQ QUESTIONS 1-9: 2

## 2021-12-21 NOTE — PROGRESS NOTES
Ulysses Carmine (:  1934) is a 80 y.o. male,Established patient, here for evaluation of the following chief complaint(s):  Sinus Problem, Cough, and Flu Vaccine        Subjective   SUBJECTIVE/OBJECTIVE:  HPI:    Chief Complaint   Patient presents with    Sinus Problem    Cough    Flu Vaccine     Pt here for sinus congestion, cough for the last several weeks. Constant drainage. Now with sinus pressure and facial pain. Recently lost his wife in September. BPs and weight stable. BP Readings from Last 3 Encounters:   21 124/76   06/15/21 (!) 140/64   21 116/70     Wt Readings from Last 3 Encounters:   21 146 lb 8 oz (66.5 kg)   06/15/21 152 lb 6.4 oz (69.1 kg)   21 150 lb 6.4 oz (68.2 kg)       Patient Active Problem List   Diagnosis    S/P colonoscopy    HTN (hypertension)    Hyperlipidemia with target LDL less than 100    History of prostate cancer    Elevated PSA    S/P prostatectomy    Adenocarcinoma of prostate [facility])     Past Surgical History:   Procedure Laterality Date    COLONOSCOPY  13    Dr. Mahendra Fong Right      INGUINAL HERNIA REPAIR Left      INGUINAL HERNIA REPAIR Right s     OR COLON CA SCRN NOT  [address] IND Left 2018    COLONOSCOPY performed by Keith Atkins MD at [address]' prostatectomy     Social History     Tobacco Use    Smoking status: Former Smoker     Packs/day: 1.00     Years: 20.00     Pack years: 20.00     Types: Cigarettes     Quit date: 1992     Years since quittin.9    Smokeless tobacco: Never Used   Vaping Use    Vaping Use: Never used   Substance Use Topics    Alcohol use: No     Comment: Quit about 15 years ago     Drug use: No         Review of Systems   Constitutional: Negative. HENT: Positive for congestion, postnasal drip and rhinorrhea. Respiratory: Positive for cough.  Negative for chest tightness, shortness of breath and wheezing. Cardiovascular: Negative. Gastrointestinal: Negative. Musculoskeletal: Negative. All other systems reviewed and are negative. Objective   Physical Exam  Vitals and nursing note reviewed. Constitutional:       General: He is not in acute distress. Appearance: Normal appearance. He is well-developed. HENT:      Head: Normocephalic and atraumatic. Right Ear: Tympanic membrane normal.      Left Ear: Tympanic membrane normal.   Eyes:      Conjunctiva/sclera: Conjunctivae normal.   Cardiovascular:      Rate and Rhythm: Normal rate and regular rhythm. Heart sounds: Normal heart sounds. No murmur heard. Pulmonary:      Effort: Pulmonary effort is normal.      Breath sounds: Normal breath sounds. No wheezing, rhonchi or rales. Abdominal:      General: There is no distension. Musculoskeletal:      Cervical back: Neck supple. Skin:     General: Skin is warm and dry. Findings: No rash (on exposed surfaces). Neurological:      General: No focal deficit present. Mental Status: He is alert. Psychiatric:         Attention and Perception: Attention normal.         Mood and Affect: Mood normal.         Speech: Speech normal.         Behavior: Behavior normal. Behavior is cooperative. Thought Content: Thought content normal.         Judgment: Judgment normal.               ASSESSMENT/PLAN:  1. LRTI (lower respiratory tract infection)  -     azithromycin (ZITHROMAX) 250 MG tablet; Take 1 tablet by mouth See Admin Instructions for 5 days 500mg on day 1 followed by 250mg on days 2 - 5, Disp-6 tablet, R-0Normal  2. Acute rhinosinusitis  3. Grieving  4. Essential hypertension  -     CBC Auto Differential; Future  5. Hyperlipidemia with target LDL less than 100  -     Lipid Panel w/ Reflex Direct LDL; Future  -     Comprehensive Metabolic Panel; Future  -     TSH with Reflex; Future  6.  IFG (impaired fasting glucose)  -

## 2021-12-21 NOTE — PROGRESS NOTES
Immunizations Administered     Name Date Dose Route    Influenza, Quadv, adjuvanted, 65 yrs +, IM, PF (Fluad) 12/21/2021 0.5 mL Intramuscular    Site: Deltoid- Right    Lot: [id number]    NDC: 92424-870-84          Patient was given FLUAD high dose quadrivalent flu vaccine 0.5 ml IM in right deltoid per v.oJuan Manuel Brewster. Patient tolerated well and without incident. VIS given and reviewed.   NDC# 32374-308-64 LOT# [id number] Exp: 5/24/2022

## 2021-12-22 ENCOUNTER — HOSPITAL ENCOUNTER (OUTPATIENT)
Age: 86
Discharge: HOME | End: 2021-12-22
Payer: MEDICARE

## 2021-12-22 DIAGNOSIS — I10 ESSENTIAL HYPERTENSION: ICD-10-CM

## 2021-12-22 DIAGNOSIS — E78.5 HYPERLIPIDEMIA WITH TARGET LDL LESS THAN 100: ICD-10-CM

## 2021-12-22 DIAGNOSIS — R73.01 IFG (IMPAIRED FASTING GLUCOSE): ICD-10-CM

## 2021-12-22 LAB
ALBUMIN SERPL-MCNC: 3.9 G/DL (ref 3.5–5.1)
ALP BLD-CCNC: 103 U/L (ref 38–126)
ALT SERPL-CCNC: 14 U/L (ref 11–66)
ANION GAP SERPL CALCULATED.3IONS-SCNC: 8 MEQ/L (ref 8–16)
AST SERPL-CCNC: 13 U/L (ref 5–40)
AVERAGE GLUCOSE: 114 MG/DL (ref 70–126)
BASOPHILS # BLD: 0.9 %
BASOPHILS ABSOLUTE: 0.1 THOU/MM3 (ref 0–0.1)
BILIRUB SERPL-MCNC: 0.8 MG/DL (ref 0.3–1.2)
BUN BLDV-MCNC: 9 MG/DL (ref 7–22)
CALCIUM SERPL-MCNC: 8.8 MG/DL (ref 8.5–10.5)
CHLORIDE BLD-SCNC: 100 MEQ/L (ref 98–111)
CHOLESTEROL, TOTAL: 124 MG/DL (ref 100–199)
CO2: 28 MEQ/L (ref 23–33)
CREAT SERPL-MCNC: 0.7 MG/DL (ref 0.4–1.2)
EOSINOPHIL # BLD: 2.6 %
EOSINOPHILS ABSOLUTE: 0.2 THOU/MM3 (ref 0–0.4)
ERYTHROCYTE [DISTWIDTH] IN BLOOD BY AUTOMATED COUNT: 12.8 % (ref 11.5–14.5)
ERYTHROCYTE [DISTWIDTH] IN BLOOD BY AUTOMATED COUNT: 43.3 FL (ref 35–45)
GFR SERPL CREATININE-BSD FRML MDRD: > 90 ML/MIN/1.73M2
GLUCOSE BLD-MCNC: 107 MG/DL (ref 70–108)
HBA1C MFR BLD: 5.8 % (ref 4.4–6.4)
HCT VFR BLD CALC: 47.5 % (ref 42–52)
HDLC SERPL-MCNC: 48 MG/DL
HEMOGLOBIN: 15 GM/DL (ref 14–18)
IMMATURE GRANS (ABS): 0.03 THOU/MM3 (ref 0–0.07)
IMMATURE GRANULOCYTES: 0.5 %
LDL CHOLESTEROL CALCULATED: 54 MG/DL
LYMPHOCYTES # BLD: 17.9 %
LYMPHOCYTES ABSOLUTE: 1.2 THOU/MM3 (ref 1–4.8)
MCH RBC QN AUTO: 29.1 PG (ref 26–33)
MCHC RBC AUTO-ENTMCNC: 31.6 GM/DL (ref 32.2–35.5)
MCV RBC AUTO: 92.1 FL (ref 80–94)
MONOCYTES # BLD: 14.6 %
MONOCYTES ABSOLUTE: 1 THOU/MM3 (ref 0.4–1.3)
NUCLEATED RED BLOOD CELLS: 0 /100 WBC
PLATELET # BLD: 330 THOU/MM3 (ref 130–400)
PMV BLD AUTO: 9.8 FL (ref 9.4–12.4)
POTASSIUM SERPL-SCNC: 4.8 MEQ/L (ref 3.5–5.2)
RBC # BLD: 5.16 MILL/MM3 (ref 4.7–6.1)
SEG NEUTROPHILS: 63.5 %
SEGMENTED NEUTROPHILS ABSOLUTE COUNT: 4.3 THOU/MM3 (ref 1.8–7.7)
SODIUM BLD-SCNC: 136 MEQ/L (ref 135–145)
TOTAL PROTEIN: 6.1 G/DL (ref 6.1–8)
TRIGL SERPL-MCNC: 109 MG/DL (ref 0–199)
TSH SERPL DL<=0.05 MIU/L-ACNC: 0.71 UIU/ML (ref 0.4–4.2)
WBC # BLD: 6.7 THOU/MM3 (ref 4.8–10.8)

## 2021-12-22 PROCEDURE — 84443 ASSAY THYROID STIM HORMONE: CPT

## 2021-12-22 PROCEDURE — 83036 HEMOGLOBIN GLYCOSYLATED A1C: CPT

## 2021-12-22 PROCEDURE — 85025 COMPLETE CBC W/AUTO DIFF WBC: CPT

## 2021-12-22 PROCEDURE — 80053 COMPREHEN METABOLIC PANEL: CPT

## 2021-12-22 PROCEDURE — 36415 COLL VENOUS BLD VENIPUNCTURE: CPT

## 2021-12-22 PROCEDURE — 80061 LIPID PANEL: CPT

## 2022-02-27 DIAGNOSIS — I10 ESSENTIAL HYPERTENSION: ICD-10-CM

## 2022-02-28 RX ORDER — IRBESARTAN 300 MG/1
TABLET ORAL
Qty: 90 TABLET | Refills: 3 | Status: SHIPPED | OUTPATIENT
Start: 2022-02-28

## 2022-03-10 ENCOUNTER — HOSPITAL ENCOUNTER (OUTPATIENT)
Age: 87
Discharge: HOME | End: 2022-03-10
Payer: MEDICARE

## 2022-03-10 LAB
ALBUMIN SERPL-MCNC: 4.1 G/DL (ref 3.5–5.1)
ALP BLD-CCNC: 77 U/L (ref 38–126)
ALT SERPL-CCNC: 17 U/L (ref 11–66)
ANION GAP SERPL CALCULATED.3IONS-SCNC: 10 MEQ/L (ref 8–16)
AST SERPL-CCNC: 15 U/L (ref 5–40)
BILIRUB SERPL-MCNC: 0.6 MG/DL (ref 0.3–1.2)
BILIRUBIN DIRECT: < 0.2 MG/DL (ref 0–0.3)
BUN BLDV-MCNC: 12 MG/DL (ref 7–22)
CALCIUM SERPL-MCNC: 9.1 MG/DL (ref 8.5–10.5)
CHLORIDE BLD-SCNC: 98 MEQ/L (ref 98–111)
CHOLESTEROL, TOTAL: 185 MG/DL (ref 100–199)
CO2: 28 MEQ/L (ref 23–33)
CREAT SERPL-MCNC: 0.7 MG/DL (ref 0.4–1.2)
ERYTHROCYTE [DISTWIDTH] IN BLOOD BY AUTOMATED COUNT: 13.2 % (ref 11.5–14.5)
ERYTHROCYTE [DISTWIDTH] IN BLOOD BY AUTOMATED COUNT: 45 FL (ref 35–45)
GFR SERPL CREATININE-BSD FRML MDRD: > 90 ML/MIN/1.73M2
GLUCOSE BLD-MCNC: 101 MG/DL (ref 70–108)
HCT VFR BLD CALC: 49.9 % (ref 42–52)
HDLC SERPL-MCNC: 57 MG/DL
HEMOGLOBIN: 15.9 GM/DL (ref 14–18)
LDL CHOLESTEROL CALCULATED: 102 MG/DL
MCH RBC QN AUTO: 29.4 PG (ref 26–33)
MCHC RBC AUTO-ENTMCNC: 31.9 GM/DL (ref 32.2–35.5)
MCV RBC AUTO: 92.4 FL (ref 80–94)
PLATELET # BLD: 236 THOU/MM3 (ref 130–400)
PMV BLD AUTO: 10.2 FL (ref 9.4–12.4)
POTASSIUM SERPL-SCNC: 4.9 MEQ/L (ref 3.5–5.2)
RBC # BLD: 5.4 MILL/MM3 (ref 4.7–6.1)
SODIUM BLD-SCNC: 136 MEQ/L (ref 135–145)
TOTAL PROTEIN: 6.4 G/DL (ref 6.1–8)
TRIGL SERPL-MCNC: 129 MG/DL (ref 0–199)
WBC # BLD: 6.3 THOU/MM3 (ref 4.8–10.8)

## 2022-03-10 PROCEDURE — 80061 LIPID PANEL: CPT

## 2022-03-10 PROCEDURE — 36415 COLL VENOUS BLD VENIPUNCTURE: CPT

## 2022-03-10 PROCEDURE — 82248 BILIRUBIN DIRECT: CPT

## 2022-03-10 PROCEDURE — 85027 COMPLETE CBC AUTOMATED: CPT

## 2022-03-10 PROCEDURE — 80053 COMPREHEN METABOLIC PANEL: CPT

## 2022-04-11 VITALS
SYSTOLIC BLOOD PRESSURE: 139 MMHG | WEIGHT: 162 LBS | HEART RATE: 78 BPM | DIASTOLIC BLOOD PRESSURE: 79 MMHG | BODY MASS INDEX: 22.59 KG/M2

## 2022-04-28 DIAGNOSIS — E78.5 HYPERLIPIDEMIA WITH TARGET LDL LESS THAN 100: ICD-10-CM

## 2022-04-28 RX ORDER — SIMVASTATIN 20 MG
TABLET ORAL
Qty: 90 TABLET | Refills: 3 | Status: SHIPPED | OUTPATIENT
Start: 2022-04-28

## 2022-04-29 ENCOUNTER — HOSPITAL ENCOUNTER (OUTPATIENT)
Age: 87
Discharge: HOME | End: 2022-04-29
Payer: MEDICARE

## 2022-04-29 LAB — PROSTATE SPECIFIC ANTIGEN: < 0.02 NG/ML (ref 0–1)

## 2022-04-29 PROCEDURE — G0103 PSA SCREENING: HCPCS

## 2022-04-29 PROCEDURE — 36415 COLL VENOUS BLD VENIPUNCTURE: CPT

## 2022-06-03 DIAGNOSIS — I10 ESSENTIAL HYPERTENSION: ICD-10-CM

## 2022-06-04 RX ORDER — BENAZEPRIL HYDROCHLORIDE 10 MG/1
TABLET ORAL
Qty: 90 TABLET | Refills: 3 | Status: SHIPPED | OUTPATIENT
Start: 2022-06-04

## 2022-06-23 DIAGNOSIS — I10 ESSENTIAL HYPERTENSION: ICD-10-CM

## 2022-06-23 RX ORDER — AMLODIPINE BESYLATE 5 MG/1
TABLET ORAL
Qty: 90 TABLET | Refills: 3 | Status: SHIPPED | OUTPATIENT
Start: 2022-06-23

## 2022-09-14 ENCOUNTER — OFFICE VISIT (OUTPATIENT)
Dept: FAMILY MEDICINE CLINIC | Age: 87
End: 2022-09-14
Payer: MEDICARE

## 2022-09-14 VITALS
HEART RATE: 68 BPM | RESPIRATION RATE: 16 BRPM | WEIGHT: 153.8 LBS | SYSTOLIC BLOOD PRESSURE: 124 MMHG | DIASTOLIC BLOOD PRESSURE: 68 MMHG | OXYGEN SATURATION: 96 % | HEIGHT: 68 IN | BODY MASS INDEX: 23.31 KG/M2

## 2022-09-14 DIAGNOSIS — E78.5 HYPERLIPIDEMIA WITH TARGET LDL LESS THAN 100: ICD-10-CM

## 2022-09-14 DIAGNOSIS — Z00.00 MEDICARE ANNUAL WELLNESS VISIT, SUBSEQUENT: Primary | ICD-10-CM

## 2022-09-14 DIAGNOSIS — L98.9 NON-HEALING SKIN LESION: ICD-10-CM

## 2022-09-14 DIAGNOSIS — R73.01 IFG (IMPAIRED FASTING GLUCOSE): ICD-10-CM

## 2022-09-14 DIAGNOSIS — Z91.09 ENVIRONMENTAL ALLERGIES: ICD-10-CM

## 2022-09-14 DIAGNOSIS — C61 ADENOCARCINOMA OF PROSTATE (HCC): ICD-10-CM

## 2022-09-14 DIAGNOSIS — I10 ESSENTIAL HYPERTENSION: ICD-10-CM

## 2022-09-14 PROCEDURE — G0439 PPPS, SUBSEQ VISIT: HCPCS | Performed by: FAMILY MEDICINE

## 2022-09-14 PROCEDURE — 1123F ACP DISCUSS/DSCN MKR DOCD: CPT | Performed by: FAMILY MEDICINE

## 2022-09-14 ASSESSMENT — LIFESTYLE VARIABLES
HOW OFTEN DO YOU HAVE A DRINK CONTAINING ALCOHOL: NEVER
HOW MANY STANDARD DRINKS CONTAINING ALCOHOL DO YOU HAVE ON A TYPICAL DAY: PATIENT DOES NOT DRINK

## 2022-09-14 ASSESSMENT — PATIENT HEALTH QUESTIONNAIRE - PHQ9
2. FEELING DOWN, DEPRESSED OR HOPELESS: 0
SUM OF ALL RESPONSES TO PHQ QUESTIONS 1-9: 0
SUM OF ALL RESPONSES TO PHQ9 QUESTIONS 1 & 2: 0
SUM OF ALL RESPONSES TO PHQ QUESTIONS 1-9: 0
SUM OF ALL RESPONSES TO PHQ QUESTIONS 1-9: 0
1. LITTLE INTEREST OR PLEASURE IN DOING THINGS: 0
SUM OF ALL RESPONSES TO PHQ QUESTIONS 1-9: 0

## 2022-09-14 ASSESSMENT — ENCOUNTER SYMPTOMS
GASTROINTESTINAL NEGATIVE: 1
RESPIRATORY NEGATIVE: 1

## 2022-09-14 NOTE — PROGRESS NOTES
Referral to dermatology Dr. Christel Blackmon faxed to his office at [phone] for dx of non-healing skin wound. Patient aware that DERM will call him to schedule.

## 2022-09-14 NOTE — PATIENT INSTRUCTIONS
Personalized Preventive Plan for Mustapha Alfonso - 9/14/2022  Medicare offers a range of preventive health benefits. Some of the tests and screenings are paid in full while other may be subject to a deductible, co-insurance, and/or copay. Some of these benefits include a comprehensive review of your medical history including lifestyle, illnesses that may run in your family, and various assessments and screenings as appropriate. After reviewing your medical record and screening and assessments performed today your provider may have ordered immunizations, labs, imaging, and/or referrals for you. A list of these orders (if applicable) as well as your Preventive Care list are included within your After Visit Summary for your review. Other Preventive Recommendations:    A preventive eye exam performed by an eye specialist is recommended every 1-2 years to screen for glaucoma; cataracts, macular degeneration, and other eye disorders. A preventive dental visit is recommended every 6 months. Try to get at least 150 minutes of exercise per week or 10,000 steps per day on a pedometer . Order or download the FREE \"Exercise & Physical Activity: Your Everyday Guide\" from [employer]. Call 1-583.716.5464 or search [employer] online. You need 0794-9915 mg of calcium and 4980-0191 IU of vitamin D per day. It is possible to meet your calcium requirement with diet alone, but a vitamin D supplement is usually necessary to meet this goal.  When exposed to the sun, use a sunscreen that protects against both UVA and UVB radiation with an SPF of 30 or greater. Reapply every 2 to 3 hours or after sweating, drying off with a towel, or swimming. Always wear a seat belt when traveling in a car. Always wear a helmet when riding a bicycle or motorcycle.

## 2022-09-14 NOTE — PROGRESS NOTES
2022    Robert Benjamin (:  1934) is a 80 y.o. male, here for a preventive medicine evaluation. Chief Complaint   Patient presents with    Medicare AWV     AWV. Pt has a lesion to the right side of his face that has been there for several months. The lesion has been growing. Hx of prostate cancer. Lab Results   Component Value Date    PSA <0.02 2022    PSA <0.02 2021    PSA <0.02 2021     BPs and weight stable. BP Readings from Last 3 Encounters:   22 124/68   22 139/79   21 124/76     Wt Readings from Last 3 Encounters:   22 153 lb 12.8 oz (69.8 kg)   22 162 lb (73.5 kg)   21 146 lb 8 oz (66.5 kg)     Tolerating meds, compliant. Patient Active Problem List   Diagnosis    S/P colonoscopy    HTN (hypertension)    Hyperlipidemia with target LDL less than 100    History of prostate cancer    Elevated PSA    S/P prostatectomy    Adenocarcinoma of prostate ([facility] [address]       Review of Systems   Constitutional: Negative. HENT: Negative. Respiratory: Negative. Cardiovascular: Negative. Gastrointestinal: Negative. Musculoskeletal: Negative. Skin:         Sore on cheek     All other systems reviewed and are negative. Prior to Visit Medications    Medication Sig Taking?  Authorizing Provider   amLODIPine (NORVASC) 5 MG tablet TAKE 1 TABLET BY MOUTH EVERY DAY Yes Rosalio Crespo, DO   benazepril (LOTENSIN) 10 MG tablet TAKE 1 TABLET BY MOUTH EVERY DAY Yes Rosalio Crespo, DO   simvastatin (ZOCOR) 20 MG tablet TAKE 1 TABLET BY MOUTH EVERY DAY AT NIGHT Yes Rosalio Crespo, DO   irbesartan (AVAPRO) 300 MG tablet TAKE 1 TABLET BY MOUTH EVERY DAY Yes Rosalio Party, DO   predniSONE (DELTASONE) 10 MG tablet TAKE 2 TABLETS BY MOUTH TWICE A DAY FOR 5 DAYS Yes Rosalio Crespo, DO   CRANBERRY PO Take by mouth daily Yes Historical Provider, MD   ipratropium (ATROVENT) 0.03 % nasal spray SPRAY 2 SPRAYS BY NASAL ROUTE EVERY 12 HOURS Yes Mar Blake,    aspirin 81 MG tablet Take 81 mg by mouth daily  Yes Historical Provider, MD   Multiple Vitamins-Minerals (MULTIVITAMIN PO) Take 1 tablet by mouth daily. Yes Historical Provider, MD   b complex vitamins capsule Take 1 capsule by mouth daily. Yes Historical Provider, MD   Omega 3 1000 MG CAPS Take 1 tablet by mouth daily. Yes Historical Provider, MD   Vitamin D (CHOLECALCIFEROL) 1000 UNITS CAPS capsule Take 1,000 Units by mouth daily. Yes Historical Provider, MD        Allergies   Allergen Reactions    Pcn [Penicillins] Hives    Tetracyclines & Related Other (See Comments)     BLISTERS WHEN URINATING       Past Medical History:   Diagnosis Date    Hyperlipidemia     Hypertension     Prostate cancer ([facility] [address]     38 rounds of radiation for prostate Ca       Past Surgical History:   Procedure Laterality Date    COLONOSCOPY  13    Dr. Gucci Dumas Right      INGUINAL HERNIA REPAIR Left      INGUINAL HERNIA REPAIR Right      SD COLON CA SCRN NOT  [address] IND Left 2018    COLONOSCOPY performed by Tom Christensen MD at [address] [place] [address] in [facility]       Social History     Socioeconomic History    Marital status:       Spouse name: Yenni Weldon    Number of children: 1    Years of education: 15    Highest education level: Associate degree: occupational, technical, or vocational program   Occupational History    Occupation: retired    Tobacco Use    Smoking status: Former     Packs/day: 1.00     Years: 20.00     Pack years: 20.00     Types: Cigarettes     Quit date: 1992     Years since quittin.7    Smokeless tobacco: Never   Vaping Use    Vaping Use: Never used   Substance and Sexual Activity    Alcohol use: No     Comment: Quit about 15 years ago     Drug use: No    Sexual activity: Not on file   Other Topics Concern    Not on file   Social History Narrative    Not Mental Status: He is alert. Psychiatric:         Attention and Perception: Attention normal.         Mood and Affect: Mood normal.         Speech: Speech normal.         Behavior: Behavior normal. Behavior is cooperative. Thought Content: Thought content normal.         Judgment: Judgment normal.       No flowsheet data found. Lab Results   Component Value Date/Time    CHOL 185 03/10/2022 10:49 AM    CHOL 124 12/22/2021 10:25 AM    CHOL 150 12/03/2020 09:42 AM    TRIG 129 03/10/2022 10:49 AM    TRIG 109 12/22/2021 10:25 AM    TRIG 108 12/03/2020 09:42 AM    HDL 57 03/10/2022 10:49 AM    HDL 48 12/22/2021 10:25 AM    HDL 52 12/03/2020 09:42 AM    LDLCALC 102 03/10/2022 10:49 AM    LDLCALC 54 12/22/2021 10:25 AM    LDLCALC 76 12/03/2020 09:42 AM    GLUCOSE 101 03/10/2022 10:49 AM    GLUCOSE 106 03/03/2015 10:00 AM    LABA1C 5.8 12/22/2021 10:25 AM    LABA1C 5.5 10/30/2020 12:07 PM    LABA1C 5.4 11/03/2017 07:32 AM       The ASCVD Risk score (Cathy Butt, et al., 2013) failed to calculate for the following reasons:     The 2013 ASCVD risk score is only valid for ages 36 to 78    Immunization History   Administered Date(s) Administered    COVID-19, MODERNA BLUE border, Primary or Immunocompromised, (age 12y+), IM, 100 mcg/0.5mL 01/20/2021, 02/26/2021    COVID-19, [place] Vaccine, Vaccine Unspecified 01/12/2022    Influenza Vaccine, unspecified formulation 11/23/2015    Influenza, AFLURIA (age 1 yrs+), FLUZONE, (age 10 mo+), MDV, 0.5mL 11/29/2016, 09/13/2017    Influenza, FLUAD, (age 72 y+), Adjuvanted, 0.5mL 10/12/2020, 12/21/2021    Influenza, High Dose (Fluzone 65 yrs and older) 10/08/2018    Influenza, Triv, inactivated, subunit, adjuvanted, IM (Fluad 65 yrs and older) 10/02/2019    Pneumococcal Conjugate 13-valent ([id number]) 09/11/2015    Pneumococcal Polysaccharide ([id number]) 02/12/2018    Td, unspecified formulation 03/29/2017       Health Maintenance   Topic Date Due    Shingles vaccine (1 of 2) Never done    DTaP/Tdap/Td vaccine (1 - Tdap) 03/30/2017    COVID-19 Vaccine (4 - Booster for Moderna series) 05/12/2022    Flu vaccine (1) 09/01/2022    Depression Screen  12/21/2022    Lipids  03/10/2023    Prostate Specific Antigen (PSA) Screening or Monitoring  04/29/2023    Annual Wellness Visit (AWV)  09/15/2023    Pneumococcal 65+ years Vaccine  Completed    Hepatitis A vaccine  Aged Out    Hepatitis B vaccine  Aged Out    Hib vaccine  Aged Out    Meningococcal (ACWY) vaccine  Aged Out       Assessment & Plan   Medicare annual wellness visit, subsequent  Non-healing skin lesion  -     AFL - Teresa Kraus DO, Dermatology, [place]  Adenocarcinoma of prostate ([facility] [address]  IFG (impaired fasting glucose)  Essential hypertension  Hyperlipidemia with target LDL less than 100  Environmental allergies    -  Chronic medical problems stable  -  Continue current medications  -  Follow up with specialists as scheduled  -  Refer to Derm    Return for Medicare Annual Wellness Visit in 1 year. --Kristen Plummer DO      Medicare Annual Wellness Visit    Agustin Johnson is here for Medicare AWV    Assessment & Plan   Medicare annual wellness visit, subsequent  Non-healing skin lesion  -     AFL - Teresa Kraus DO, Dermatology, [place]  Adenocarcinoma of prostate [facility])  IFG (impaired fasting glucose)  Essential hypertension  Hyperlipidemia with target LDL less than 100  Environmental allergies    Recommendations for Preventive Services Due: see orders and patient instructions/AVS.  Recommended screening schedule for the next 5-10 years is provided to the patient in written form: see Patient Instructions/AVS.     Return for Medicare Annual Wellness Visit in 1 year. Subjective       Patient's complete Health Risk Assessment and screening values have been reviewed and are found in Flowsheets. The following problems were reviewed today and where indicated follow up appointments were made and/or referrals ordered.     Positive Risk Factor Screenings with Interventions:             General Health and ACP:  General  In general, how would you say your health is?: Very Good  In the past 7 days, have you experienced any of the following: New or Increased Pain, New or Increased Fatigue, Loneliness, Social Isolation, Stress or Anger?: No  Do you get the social and emotional support that you need?: Yes  Do you have a Living Will?: Yes    Advance Directives       Power of  Living Will ACP-Advance Directive ACP-Power of     Not on File Not on File Not on File Not on File          General Health Risk Interventions:  NA    Health Habits/Nutrition:  Physical Activity: Inactive    Days of Exercise per Week: 0 days    Minutes of Exercise per Session: 0 min     Have you lost any weight without trying in the past 3 months?: No  Body mass index: 23.38  Have you seen the dentist within the past year?: N/A - wear dentures  Health Habits/Nutrition Interventions:  NA    Hearing/Vision:  Do you or your family notice any trouble with your hearing that hasn't been managed with hearing aids?: No  Do you have difficulty driving, watching TV, or doing any of your daily activities because of your eyesight?: No  Have you had an eye exam within the past year?: (!) No  No results found. Hearing/Vision Interventions:  Vision concerns:  patient encouraged to make appointment with his/her eye specialist            Objective   Vitals:    09/14/22 0926   BP: 124/68   Pulse: 68   Resp: 16   SpO2: 96%   Weight: 153 lb 12.8 oz (69.8 kg)   Height: 5' 8\" (1.727 m)      Body mass index is 23.39 kg/m². Allergies   Allergen Reactions    Pcn [Penicillins] Hives    Tetracyclines & Related Other (See Comments)     BLISTERS WHEN URINATING     Prior to Visit Medications    Medication Sig Taking?  Authorizing Provider   amLODIPine (NORVASC) 5 MG tablet TAKE 1 TABLET BY MOUTH EVERY DAY Yes Johana Valerio, DO   benazepril (LOTENSIN) 10 MG tablet TAKE 1 TABLET BY MOUTH EVERY DAY Yes Gifty Ramirez DO   simvastatin (ZOCOR) 20 MG tablet TAKE 1 TABLET BY MOUTH EVERY DAY AT NIGHT Yes Gifty Ramirez DO   irbesartan (AVAPRO) 300 MG tablet TAKE 1 TABLET BY MOUTH EVERY DAY Yes Gifty Ramirez DO   predniSONE (DELTASONE) 10 MG tablet TAKE 2 TABLETS BY MOUTH TWICE A DAY FOR 5 DAYS Yes Gifty Ramirez DO   CRANBERRY PO Take by mouth daily Yes Historical Provider, MD   ipratropium (ATROVENT) 0.03 % nasal spray SPRAY 2 SPRAYS BY NASAL ROUTE EVERY 12 HOURS Yes Gifty Ramirez DO   aspirin 81 MG tablet Take 81 mg by mouth daily  Yes Historical Provider, MD   Multiple Vitamins-Minerals (MULTIVITAMIN PO) Take 1 tablet by mouth daily. Yes Historical Provider, MD   b complex vitamins capsule Take 1 capsule by mouth daily. Yes Historical Provider, MD   Omega 3 1000 MG CAPS Take 1 tablet by mouth daily. Yes Historical Provider, MD   Vitamin D (CHOLECALCIFEROL) 1000 UNITS CAPS capsule Take 1,000 Units by mouth daily.  Yes Historical Provider, MD Fish (Including outside providers/suppliers regularly involved in providing care):   Patient Care Team:  Gifty Ramirez DO as PCP - General (Family Medicine)  Gifty Ramirez DO as PCP - [facility] Empaneled Provider  Vane Baez MD as Consulting Physician (Urology)  Michelle Sewell MD as Consulting Physician (Cardiology)  Eboni Hills MD (Radiation Oncology)     Reviewed and updated this visit:  Tobacco  Allergies  Meds  Med Hx  Surg Hx  Soc Hx  Fam Hx

## 2022-09-17 ENCOUNTER — HOSPITAL ENCOUNTER (OUTPATIENT)
Age: 87
Discharge: HOME | End: 2022-09-17
Payer: MEDICARE

## 2022-09-17 LAB
ANION GAP SERPL CALCULATED.3IONS-SCNC: 8 MEQ/L (ref 8–16)
BUN BLDV-MCNC: 12 MG/DL (ref 7–22)
CALCIUM SERPL-MCNC: 9.1 MG/DL (ref 8.5–10.5)
CHLORIDE BLD-SCNC: 99 MEQ/L (ref 98–111)
CO2: 29 MEQ/L (ref 23–33)
CREAT SERPL-MCNC: 0.7 MG/DL (ref 0.4–1.2)
ERYTHROCYTE [DISTWIDTH] IN BLOOD BY AUTOMATED COUNT: 12.9 % (ref 11.5–14.5)
ERYTHROCYTE [DISTWIDTH] IN BLOOD BY AUTOMATED COUNT: 43.1 FL (ref 35–45)
GFR SERPL CREATININE-BSD FRML MDRD: > 90 ML/MIN/1.73M2
GLUCOSE BLD-MCNC: 107 MG/DL (ref 70–108)
HCT VFR BLD CALC: 49.7 % (ref 42–52)
HEMOGLOBIN: 16.2 GM/DL (ref 14–18)
MCH RBC QN AUTO: 29.7 PG (ref 26–33)
MCHC RBC AUTO-ENTMCNC: 32.6 GM/DL (ref 32.2–35.5)
MCV RBC AUTO: 91.2 FL (ref 80–94)
PLATELET # BLD: 226 THOU/MM3 (ref 130–400)
PMV BLD AUTO: 10.3 FL (ref 9.4–12.4)
POTASSIUM SERPL-SCNC: 4.7 MEQ/L (ref 3.5–5.2)
RBC # BLD: 5.45 MILL/MM3 (ref 4.7–6.1)
SODIUM BLD-SCNC: 136 MEQ/L (ref 135–145)
WBC # BLD: 7.2 THOU/MM3 (ref 4.8–10.8)

## 2022-09-17 PROCEDURE — 36415 COLL VENOUS BLD VENIPUNCTURE: CPT

## 2022-09-17 PROCEDURE — 80048 BASIC METABOLIC PNL TOTAL CA: CPT

## 2022-09-17 PROCEDURE — 85027 COMPLETE CBC AUTOMATED: CPT

## 2022-09-27 ENCOUNTER — OFFICE VISIT (OUTPATIENT)
Dept: CARDIOLOGY CLINIC | Age: 87
End: 2022-09-27
Payer: MEDICARE

## 2022-09-27 VITALS
HEIGHT: 70 IN | RESPIRATION RATE: 18 BRPM | SYSTOLIC BLOOD PRESSURE: 136 MMHG | WEIGHT: 156.8 LBS | DIASTOLIC BLOOD PRESSURE: 72 MMHG | HEART RATE: 80 BPM | BODY MASS INDEX: 22.45 KG/M2

## 2022-09-27 DIAGNOSIS — E78.5 DYSLIPIDEMIA (HIGH LDL; LOW HDL): ICD-10-CM

## 2022-09-27 DIAGNOSIS — I10 PRIMARY HYPERTENSION: Primary | ICD-10-CM

## 2022-09-27 PROCEDURE — 1036F TOBACCO NON-USER: CPT | Performed by: INTERNAL MEDICINE

## 2022-09-27 PROCEDURE — G8427 DOCREV CUR MEDS BY ELIG CLIN: HCPCS | Performed by: INTERNAL MEDICINE

## 2022-09-27 PROCEDURE — 93000 ELECTROCARDIOGRAM COMPLETE: CPT | Performed by: INTERNAL MEDICINE

## 2022-09-27 PROCEDURE — 1123F ACP DISCUSS/DSCN MKR DOCD: CPT | Performed by: INTERNAL MEDICINE

## 2022-09-27 PROCEDURE — G8420 CALC BMI NORM PARAMETERS: HCPCS | Performed by: INTERNAL MEDICINE

## 2022-09-27 PROCEDURE — 99213 OFFICE O/P EST LOW 20 MIN: CPT | Performed by: INTERNAL MEDICINE

## 2022-09-27 ASSESSMENT — ENCOUNTER SYMPTOMS
SHORTNESS OF BREATH: 0
COUGH: 0
CHOKING: 0
ABDOMINAL DISTENTION: 0
COLOR CHANGE: 0
VOMITING: 0
BLOOD IN STOOL: 0
NAUSEA: 0
APNEA: 0
WHEEZING: 0
ABDOMINAL PAIN: 0
TROUBLE SWALLOWING: 0
CHEST TIGHTNESS: 0
ANAL BLEEDING: 0
VOICE CHANGE: 0
STRIDOR: 0

## 2022-09-27 NOTE — PROGRESS NOTES
[address] [facility]  Dept: 3531 [facility]  Loc: [phone]     9/27/2022       Melissa Tolliver is here today for   Chief Complaint   Patient presents with    Follow-up           Referring Physician:  No ref. provider found     Patient Active Problem List   Diagnosis    S/P colonoscopy    HTN (hypertension)    Hyperlipidemia with target LDL less than 100    History of prostate cancer    Elevated PSA    S/P prostatectomy    Adenocarcinoma of prostate ([facility] [address]       Review of Systems   Constitutional:  Negative for activity change, appetite change, fatigue, fever and unexpected weight change. HENT:  Negative for congestion, trouble swallowing and voice change. Eyes:  Negative for visual disturbance. Respiratory:  Negative for apnea, cough, choking, chest tightness, shortness of breath, wheezing and stridor. Cardiovascular:  Negative for chest pain, palpitations and leg swelling. Gastrointestinal:  Negative for abdominal distention, abdominal pain, anal bleeding, blood in stool, nausea and vomiting. Endocrine: Negative for cold intolerance and heat intolerance. Genitourinary:  Negative for hematuria. Musculoskeletal:  Negative for arthralgias, gait problem, joint swelling and myalgias. Skin:  Negative for color change and rash. Allergic/Immunologic: Negative for environmental allergies and food allergies. Neurological:  Negative for dizziness, tremors, syncope, facial asymmetry, weakness, light-headedness, numbness and headaches. Hematological:  Does not bruise/bleed easily. Psychiatric/Behavioral:  Negative for agitation, behavioral problems and sleep disturbance.        Past Medical History:   Diagnosis Date    Hyperlipidemia     Hypertension     Prostate cancer ([facility] [address]     38 rounds of radiation for prostate Ca       Allergies   Allergen Reactions    Pcn [Penicillins] Hives Tetracyclines & Related Other (See Comments)     BLISTERS WHEN URINATING       Current Outpatient Medications   Medication Sig Dispense Refill    amLODIPine (NORVASC) 5 MG tablet TAKE 1 TABLET BY MOUTH EVERY DAY 90 tablet 3    benazepril (LOTENSIN) 10 MG tablet TAKE 1 TABLET BY MOUTH EVERY DAY 90 tablet 3    simvastatin (ZOCOR) 20 MG tablet TAKE 1 TABLET BY MOUTH EVERY DAY AT NIGHT 90 tablet 3    irbesartan (AVAPRO) 300 MG tablet TAKE 1 TABLET BY MOUTH EVERY DAY 90 tablet 3    predniSONE (DELTASONE) 10 MG tablet TAKE 2 TABLETS BY MOUTH TWICE A DAY FOR 5 DAYS 20 tablet 0    CRANBERRY PO Take by mouth daily      ipratropium (ATROVENT) 0.03 % nasal spray SPRAY 2 SPRAYS BY NASAL ROUTE EVERY 12 HOURS 1 Bottle 1    aspirin 81 MG tablet Take 81 mg by mouth daily       Multiple Vitamins-Minerals (MULTIVITAMIN PO) Take 1 tablet by mouth daily. b complex vitamins capsule Take 1 capsule by mouth daily. Omega 3 1000 MG CAPS Take 1 tablet by mouth daily. Vitamin D (CHOLECALCIFEROL) 1000 UNITS CAPS capsule Take 1,000 Units by mouth daily. No current facility-administered medications for this visit. Social History     Socioeconomic History    Marital status:       Spouse name: Herbert Pantoja    Number of children: 1    Years of education: 15    Highest education level: Associate degree: occupational, technical, or vocational program   Occupational History    Occupation: retired    Tobacco Use    Smoking status: Former     Packs/day: 1.00     Years: 20.00     Pack years: 20.00     Types: Cigarettes     Quit date: 1992     Years since quittin.7    Smokeless tobacco: Never   Vaping Use    Vaping Use: Never used   Substance and Sexual Activity    Alcohol use: No     Comment: Quit about 15 years ago     Drug use: No     Social Determinants of Health     Financial Resource Strain: Low Risk     Difficulty of Paying Living Expenses: Not hard at all   Food Insecurity: No Food Insecurity    Worried About Running Out of Food in the Last Year: Never true    Ran Out of Food in the Last Year: Never true   Physical Activity: Inactive    Days of Exercise per Week: 0 days    Minutes of Exercise per Session: 0 min       Family History   Problem Relation Age of Onset    Stroke Mother     Arthritis Mother     Heart Attack Father     Heart Disease Father        Blood pressure 136/72, pulse 80, resp. rate 18, height 5' 10\" (1.778 m), weight 156 lb 12.8 oz (71.1 kg). Physical Exam:    General Appearance: alert and oriented to person, place and time, in no acute distress  Cardiovascular: normal rate, regular rhythm, normal S1 and S2, no murmurs, rubs, clicks, or gallops, distal pulses intact, no carotid bruits, no JVD  Pulmonary/Chest: clear to auscultation bilaterally- no wheezes, rales or rhonchi, normal air movement, no respiratory distress  Abdomen: soft, non-tender, non-distended, normal bowel sounds, no masses   Extremities: no cyanosis, clubbing or edema, pulse   Skin: warm and dry, no rash or erythema  Head: normocephalic and atraumatic  Eyes: pupils equal, round, and reactive to light  Neck: supple and non-tender without mass, no thyromegaly   Musculoskeletal: normal range of motion, no joint swelling, deformity or tenderness  Neurological: alert, oriented, normal speech, no focal findings or movement disorder noted    Lab Data:    Cardiac Enzymes:  No results for input(s): CKTOTAL, CKMB, CKMBINDEX, TROPONINI in the last 72 hours.     CBC:   Lab Results   Component Value Date/Time    WBC 7.2 09/17/2022 09:02 AM    RBC 5.45 09/17/2022 09:02 AM    HGB 16.2 09/17/2022 09:02 AM    HCT 49.7 09/17/2022 09:02 AM     09/17/2022 09:02 AM       CMP:    Lab Results   Component Value Date/Time     09/17/2022 09:02 AM    K 4.7 09/17/2022 09:02 AM    CL 99 09/17/2022 09:02 AM    CO2 29 09/17/2022 09:02 AM    BUN 12 09/17/2022 09:02 AM    CREATININE 0.7 09/17/2022 09:02 AM    AGRATIO 2.4 03/03/2015 10:00 AM    PHOENIX >90 09/17/2022 09:02 AM    GLUCOSE 107 09/17/2022 09:02 AM    GLUCOSE 106 03/03/2015 10:00 AM    CALCIUM 9.1 09/17/2022 09:02 AM       Hepatic Function Panel:    Lab Results   Component Value Date/Time    ALKPHOS 77 03/10/2022 10:49 AM    ALT 17 03/10/2022 10:49 AM    AST 15 03/10/2022 10:49 AM    PROT 6.4 03/10/2022 10:49 AM    BILITOT 0.6 03/10/2022 10:49 AM    BILIDIR <0.2 03/10/2022 10:49 AM    LABALBU 4.1 03/10/2022 10:49 AM       Magnesium:  No results found for: MG    PT/INR:  No results found for: PROTIME, INR    HgBA1c:    Lab Results   Component Value Date/Time    LABA1C 5.8 12/22/2021 10:25 AM       FLP:    Lab Results   Component Value Date/Time    TRIG 129 03/10/2022 10:49 AM    HDL 57 03/10/2022 10:49 AM    LDLCALC 102 03/10/2022 10:49 AM    LDLDIRECT 103 03/03/2015 10:00 AM       TSH:    Lab Results   Component Value Date/Time    TSH 0.713 12/22/2021 10:25 AM        Diagnosis Orders   1. Primary hypertension  69469 - OR ELECTROCARDIOGRAM, COMPLETE    CBC    Basic Metabolic Panel    Lipid Panel    Hepatic Function Panel      2. Dyslipidemia (high LDL; low HDL)  CBC    Basic Metabolic Panel    Lipid Panel    Hepatic Function Panel           Assessment/Plan    This is  p patient who is an 80years old gentleman in excellent physical condition for his age he had a history of an atypical chest pain in the past he lost his wife is still mourning the loss of his wife has history of hypercholesterolemia has been under control with the statin has a history of hypertension has been under control patient indicates he is feeling very well he wants to continue medical treatment his EKG showed sinus rhythm the lab work the EKG findings medication were reviewed reconciled with the patient patient will be seen annually he is to seek medical attention with any change in clinical condition and follow-up with family physician the lab work were ordered.   Orders Placed This Encounter   Procedures    CBC     Standing Status:   Future     Standing Expiration Date:   8/01/3407    Basic Metabolic Panel     Standing Status:   Future     Standing Expiration Date:   9/27/2023    Lipid Panel     Standing Status:   Future     Standing Expiration Date:   9/27/2023     Order Specific Question:   Is Patient Fasting?/# of Hours     Answer:   12 hours    Hepatic Function Panel     Standing Status:   Future     Standing Expiration Date:   9/27/2023    22313 - [place] ELECTROCARDIOGRAM, COMPLETE       Return in about 1 year (around 9/27/2023) for ccad.      Wilian Gimenez MD

## 2022-11-08 ENCOUNTER — OFFICE VISIT (OUTPATIENT)
Dept: FAMILY MEDICINE CLINIC | Age: 87
End: 2022-11-08
Payer: MEDICARE

## 2022-11-08 VITALS
SYSTOLIC BLOOD PRESSURE: 130 MMHG | OXYGEN SATURATION: 95 % | DIASTOLIC BLOOD PRESSURE: 76 MMHG | HEART RATE: 100 BPM | WEIGHT: 157.8 LBS | BODY MASS INDEX: 22.64 KG/M2 | TEMPERATURE: 98 F | RESPIRATION RATE: 18 BRPM

## 2022-11-08 DIAGNOSIS — J01.90 ACUTE RHINOSINUSITIS: Primary | ICD-10-CM

## 2022-11-08 PROCEDURE — G8420 CALC BMI NORM PARAMETERS: HCPCS | Performed by: FAMILY MEDICINE

## 2022-11-08 PROCEDURE — G8484 FLU IMMUNIZE NO ADMIN: HCPCS | Performed by: FAMILY MEDICINE

## 2022-11-08 PROCEDURE — 99213 OFFICE O/P EST LOW 20 MIN: CPT | Performed by: FAMILY MEDICINE

## 2022-11-08 PROCEDURE — 1036F TOBACCO NON-USER: CPT | Performed by: FAMILY MEDICINE

## 2022-11-08 PROCEDURE — G8427 DOCREV CUR MEDS BY ELIG CLIN: HCPCS | Performed by: FAMILY MEDICINE

## 2022-11-08 PROCEDURE — 1123F ACP DISCUSS/DSCN MKR DOCD: CPT | Performed by: FAMILY MEDICINE

## 2022-11-08 RX ORDER — AZITHROMYCIN 250 MG/1
250 TABLET, FILM COATED ORAL SEE ADMIN INSTRUCTIONS
Qty: 6 TABLET | Refills: 0 | Status: SHIPPED | OUTPATIENT
Start: 2022-11-08 | End: 2022-12-01 | Stop reason: SDUPTHER

## 2022-11-08 ASSESSMENT — ENCOUNTER SYMPTOMS
GASTROINTESTINAL NEGATIVE: 1
SINUS PRESSURE: 1
COUGH: 1
RHINORRHEA: 1
SINUS PAIN: 1

## 2022-11-08 NOTE — PROGRESS NOTES
Ira Perera (:  1934) is a 80 y.o. male,Established patient, here for evaluation of the following chief complaint(s):  Nasal Congestion (Present x 3 weeks. ), Cough (Productive yellow drainage ), and Ear Fullness (Right side- has a large amount of cerumen come out ? If sinus is causing the ear concern)        Subjective   SUBJECTIVE/OBJECTIVE:  HPI:    Chief Complaint   Patient presents with    Nasal Congestion     Present x 3 weeks. Cough     Productive yellow drainage     Ear Fullness     Right side- has a large amount of cerumen come out ? If sinus is causing the ear concern     Pt presents today with c/o sinus pressure, congestion, cough for the last 3 weeks. Some pressure to the right maxillary sinus that will radiate into right ear. Cough productive, yellow mucus. Patient Active Problem List   Diagnosis    S/P colonoscopy    HTN (hypertension)    Hyperlipidemia with target LDL less than 100    History of prostate cancer    Elevated PSA    S/P prostatectomy    Adenocarcinoma of prostate [facility])     Past Surgical History:   Procedure Laterality Date    COLONOSCOPY  13    Dr. Salinas Salgado Right 's     INGUINAL HERNIA REPAIR Left 's     INGUINAL HERNIA REPAIR Right      ND COLON CA SCRN NOT  [address] IND Left 2018    COLONOSCOPY performed by Mindi Bailon MD at [address] [place] [address] in [facility]     Social History     Tobacco Use    Smoking status: Former     Packs/day: 1.00     Years: 20.00     Pack years: 20.00     Types: Cigarettes     Quit date: 1992     Years since quittin.8    Smokeless tobacco: Never   Vaping Use    Vaping Use: Never used   Substance Use Topics    Alcohol use: No     Comment: Quit about 15 years ago     Drug use: No         Review of Systems   Constitutional: Negative. HENT:  Positive for congestion, rhinorrhea, sinus pressure and sinus pain.     Respiratory: Positive for cough. Cardiovascular: Negative. Gastrointestinal: Negative. Musculoskeletal: Negative. All other systems reviewed and are negative. Objective   Physical Exam  Vitals and nursing note reviewed. Constitutional:       General: He is not in acute distress. Appearance: Normal appearance. He is well-developed. HENT:      Head: Normocephalic and atraumatic. Right Ear: A middle ear effusion is present. Left Ear: Tympanic membrane normal.      Nose: Mucosal edema, congestion and rhinorrhea present. Right Sinus: Maxillary sinus tenderness present. Eyes:      Conjunctiva/sclera: Conjunctivae normal.   Cardiovascular:      Rate and Rhythm: Normal rate and regular rhythm. Heart sounds: Normal heart sounds. No murmur heard. Pulmonary:      Effort: Pulmonary effort is normal.      Breath sounds: Normal breath sounds. No wheezing, rhonchi or rales. Abdominal:      General: There is no distension. Musculoskeletal:      Cervical back: Neck supple. Skin:     General: Skin is warm and dry. Findings: No rash (on exposed surfaces). Neurological:      General: No focal deficit present. Mental Status: He is alert. Psychiatric:         Attention and Perception: Attention normal.         Mood and Affect: Mood normal.         Speech: Speech normal.         Behavior: Behavior normal. Behavior is cooperative. Thought Content: Thought content normal.         Judgment: Judgment normal.             ASSESSMENT/PLAN:  1. Acute rhinosinusitis  -     azithromycin (ZITHROMAX) 250 MG tablet; Take 1 tablet by mouth See Admin Instructions for 5 days 500mg on day 1 followed by 250mg on days 2 - 5, Disp-6 tablet, R-0Normal    -  OTC decongestant, saline rinse    Return if symptoms worsen or fail to improve. An electronic signature was used to authenticate this note.     --Wei Enriquez, DO

## 2022-12-01 ENCOUNTER — TELEPHONE (OUTPATIENT)
Dept: FAMILY MEDICINE CLINIC | Age: 87
End: 2022-12-01

## 2022-12-01 DIAGNOSIS — J01.90 ACUTE RHINOSINUSITIS: ICD-10-CM

## 2022-12-01 RX ORDER — AZITHROMYCIN 250 MG/1
250 TABLET, FILM COATED ORAL SEE ADMIN INSTRUCTIONS
Qty: 6 TABLET | Refills: 0 | Status: SHIPPED | OUTPATIENT
Start: 2022-12-01 | End: 2022-12-06

## 2022-12-01 NOTE — TELEPHONE ENCOUNTER
Pt called office stating he is having sinus issues again. He has a runny nose and a tickle in his throat. He is requesting an rx to [facility]. If no call back pt will check with the pharmacy after 4pm. Please advise.

## 2023-03-09 DIAGNOSIS — I10 ESSENTIAL HYPERTENSION: ICD-10-CM

## 2023-03-09 RX ORDER — IRBESARTAN 300 MG/1
TABLET ORAL
Qty: 90 TABLET | Refills: 3 | Status: SHIPPED | OUTPATIENT
Start: 2023-03-09

## 2023-05-05 ENCOUNTER — HOSPITAL ENCOUNTER (OUTPATIENT)
Age: 88
Discharge: HOME | End: 2023-05-05
Payer: MEDICARE

## 2023-05-05 LAB — PSA SERPL-MCNC: < 0.02 NG/ML (ref 0–1)

## 2023-05-05 PROCEDURE — G0103 PSA SCREENING: HCPCS

## 2023-05-05 PROCEDURE — 36415 COLL VENOUS BLD VENIPUNCTURE: CPT

## 2023-05-11 ENCOUNTER — NURSE ONLY (OUTPATIENT)
Dept: LAB | Age: 88
End: 2023-05-11

## 2023-08-27 DIAGNOSIS — I10 ESSENTIAL HYPERTENSION: ICD-10-CM

## 2023-08-28 RX ORDER — AMLODIPINE BESYLATE 5 MG/1
TABLET ORAL
Qty: 90 TABLET | Refills: 3 | Status: SHIPPED | OUTPATIENT
Start: 2023-08-28

## 2023-10-25 ENCOUNTER — HOSPITAL ENCOUNTER (OUTPATIENT)
Age: 88
Discharge: HOME | End: 2023-10-25
Payer: MEDICARE

## 2023-10-25 LAB
ALBUMIN SERPL BCG-MCNC: 3.9 G/DL (ref 3.5–5.1)
ALP SERPL-CCNC: 94 U/L (ref 38–126)
ALT SERPL W/O P-5'-P-CCNC: 13 U/L (ref 11–66)
ANION GAP SERPL CALC-SCNC: 7 MEQ/L (ref 8–16)
AST SERPL-CCNC: 14 U/L (ref 5–40)
BILIRUB CONJ SERPL-MCNC: < 0.2 MG/DL (ref 0–0.3)
BILIRUB SERPL-MCNC: 0.7 MG/DL (ref 0.3–1.2)
BUN SERPL-MCNC: 12 MG/DL (ref 7–22)
CALCIUM SERPL-MCNC: 8.8 MG/DL (ref 8.5–10.5)
CHLORIDE SERPL-SCNC: 98 MEQ/L (ref 98–111)
CHOLEST SERPL-MCNC: 148 MG/DL (ref 100–199)
CO2 SERPL-SCNC: 30 MEQ/L (ref 23–33)
CREAT SERPL-MCNC: 0.6 MG/DL (ref 0.4–1.2)
DEPRECATED RDW RBC AUTO: 44 FL (ref 35–45)
ERYTHROCYTE [DISTWIDTH] IN BLOOD BY AUTOMATED COUNT: 13.1 % (ref 11.5–14.5)
GFR SERPL CREATININE-BSD FRML MDRD: > 60 ML/MIN/1.73M2
GLUCOSE SERPL-MCNC: 105 MG/DL (ref 70–108)
HCT VFR BLD AUTO: 47.8 % (ref 42–52)
HDLC SERPL-MCNC: 54 MG/DL
HGB BLD-MCNC: 15.2 GM/DL (ref 14–18)
LDLC SERPL CALC-MCNC: 75 MG/DL
MCH RBC QN AUTO: 29.1 PG (ref 26–33)
MCHC RBC AUTO-ENTMCNC: 31.8 GM/DL (ref 32.2–35.5)
MCV RBC AUTO: 91.6 FL (ref 80–94)
PLATELET # BLD AUTO: 240 THOU/MM3 (ref 130–400)
PMV BLD AUTO: 10.4 FL (ref 9.4–12.4)
POTASSIUM SERPL-SCNC: 4.4 MEQ/L (ref 3.5–5.2)
PROT SERPL-MCNC: 6.5 G/DL (ref 6.1–8)
PSA SERPL-MCNC: < 0.02 NG/ML (ref 0–1)
RBC # BLD AUTO: 5.22 MILL/MM3 (ref 4.7–6.1)
SODIUM SERPL-SCNC: 135 MEQ/L (ref 135–145)
TRIGL SERPL-MCNC: 94 MG/DL (ref 0–199)
WBC # BLD AUTO: 7.3 THOU/MM3 (ref 4.8–10.8)

## 2023-10-25 PROCEDURE — 82248 BILIRUBIN DIRECT: CPT

## 2023-10-25 PROCEDURE — 85027 COMPLETE CBC AUTOMATED: CPT

## 2023-10-25 PROCEDURE — 84153 ASSAY OF PSA TOTAL: CPT

## 2023-10-25 PROCEDURE — 80053 COMPREHEN METABOLIC PANEL: CPT

## 2023-10-25 PROCEDURE — 80061 LIPID PANEL: CPT

## 2023-10-25 PROCEDURE — 36415 COLL VENOUS BLD VENIPUNCTURE: CPT

## 2023-11-02 ENCOUNTER — OFFICE VISIT (OUTPATIENT)
Dept: CARDIOLOGY CLINIC | Age: 88
End: 2023-11-02

## 2023-11-02 VITALS
RESPIRATION RATE: 18 BRPM | HEART RATE: 95 BPM | WEIGHT: 149 LBS | HEIGHT: 70 IN | BODY MASS INDEX: 21.33 KG/M2 | SYSTOLIC BLOOD PRESSURE: 138 MMHG | DIASTOLIC BLOOD PRESSURE: 88 MMHG

## 2023-11-02 DIAGNOSIS — I10 PRIMARY HYPERTENSION: Primary | ICD-10-CM

## 2023-11-02 DIAGNOSIS — E78.5 DYSLIPIDEMIA (HIGH LDL; LOW HDL): ICD-10-CM

## 2023-11-02 ASSESSMENT — ENCOUNTER SYMPTOMS
ABDOMINAL DISTENTION: 0
VOMITING: 0
COUGH: 0
CHOKING: 0
COLOR CHANGE: 0
ABDOMINAL PAIN: 0
STRIDOR: 0
APNEA: 0
ANAL BLEEDING: 0
VOICE CHANGE: 0
NAUSEA: 0
CHEST TIGHTNESS: 0
TROUBLE SWALLOWING: 0
WHEEZING: 0
BLOOD IN STOOL: 0
SHORTNESS OF BREATH: 0

## 2023-11-02 NOTE — PROGRESS NOTES
[address]  Dept: 400 [facility]  Loc: [phone]     11/2/2023       Rc Marroquin is here today for   Chief Complaint   Patient presents with    Follow-up           Referring Physician:  No ref. provider found     Patient Active Problem List   Diagnosis    S/P colonoscopy    HTN (hypertension)    Hyperlipidemia with target LDL less than 100    History of prostate cancer    Elevated PSA    S/P prostatectomy    Adenocarcinoma of prostate ([address] [facility])       Review of Systems   Constitutional:  Negative for activity change, appetite change, fatigue, fever and unexpected weight change. HENT:  Negative for congestion, trouble swallowing and voice change. Eyes:  Negative for visual disturbance. Respiratory:  Negative for apnea, cough, choking, chest tightness, shortness of breath, wheezing and stridor. Cardiovascular:  Negative for chest pain, palpitations and leg swelling. Gastrointestinal:  Negative for abdominal distention, abdominal pain, anal bleeding, blood in stool, nausea and vomiting. Endocrine: Negative for cold intolerance and heat intolerance. Genitourinary:  Negative for hematuria. Musculoskeletal:  Negative for arthralgias, gait problem, joint swelling and myalgias. Skin:  Negative for color change and rash. Allergic/Immunologic: Negative for environmental allergies and food allergies. Neurological:  Negative for dizziness, tremors, syncope, facial asymmetry, weakness, light-headedness, numbness and headaches. Hematological:  Does not bruise/bleed easily. Psychiatric/Behavioral:  Negative for agitation, behavioral problems and sleep disturbance.          Past Medical History:   Diagnosis Date    Hyperlipidemia     Hypertension     Prostate cancer ([address] )     38 rounds of radiation for prostate Ca       Allergies   Allergen Reactions    Pcn [Penicillins] Hives

## 2024-02-28 ENCOUNTER — TELEPHONE (OUTPATIENT)
Dept: FAMILY MEDICINE CLINIC | Age: 89
End: 2024-02-28

## 2024-02-28 NOTE — TELEPHONE ENCOUNTER
----- Message from Noni Taylor sent at 2/28/2024  1:52 PM EST -----  Subject: Referral Request    Reason for referral request? AWV blood work   Provider patient wants to be referred to(if known):     Provider Phone Number(if known):    Additional Information for Provider? Pt has appt 3/12 for [facility]V would like   sent over to [facility]. Please call pt per himself.   ---------------------------------------------------------------------------  --------------  CALL BACK INFO    [phone]; OK to leave message on voicemail  ---------------------------------------------------------------------------  --------------

## 2024-03-12 ENCOUNTER — OFFICE VISIT (OUTPATIENT)
Dept: FAMILY MEDICINE CLINIC | Age: 89
End: 2024-03-12
Payer: MEDICARE

## 2024-03-12 VITALS
HEART RATE: 76 BPM | RESPIRATION RATE: 16 BRPM | BODY MASS INDEX: 21.96 KG/M2 | WEIGHT: 153.4 LBS | SYSTOLIC BLOOD PRESSURE: 126 MMHG | HEIGHT: 70 IN | DIASTOLIC BLOOD PRESSURE: 70 MMHG

## 2024-03-12 DIAGNOSIS — R73.01 IFG (IMPAIRED FASTING GLUCOSE): ICD-10-CM

## 2024-03-12 DIAGNOSIS — Z00.00 MEDICARE ANNUAL WELLNESS VISIT, SUBSEQUENT: Primary | ICD-10-CM

## 2024-03-12 DIAGNOSIS — E78.5 HYPERLIPIDEMIA WITH TARGET LDL LESS THAN 100: ICD-10-CM

## 2024-03-12 DIAGNOSIS — J40 TRACHEOBRONCHITIS: ICD-10-CM

## 2024-03-12 DIAGNOSIS — J01.90 ACUTE RHINOSINUSITIS: ICD-10-CM

## 2024-03-12 DIAGNOSIS — C61 ADENOCARCINOMA OF PROSTATE (HCC): ICD-10-CM

## 2024-03-12 DIAGNOSIS — I10 ESSENTIAL HYPERTENSION: ICD-10-CM

## 2024-03-12 PROCEDURE — G8484 FLU IMMUNIZE NO ADMIN: HCPCS | Performed by: FAMILY MEDICINE

## 2024-03-12 PROCEDURE — 99214 OFFICE O/P EST MOD 30 MIN: CPT | Performed by: FAMILY MEDICINE

## 2024-03-12 PROCEDURE — 1123F ACP DISCUSS/DSCN MKR DOCD: CPT | Performed by: FAMILY MEDICINE

## 2024-03-12 PROCEDURE — 1036F TOBACCO NON-USER: CPT | Performed by: FAMILY MEDICINE

## 2024-03-12 PROCEDURE — G8420 CALC BMI NORM PARAMETERS: HCPCS | Performed by: FAMILY MEDICINE

## 2024-03-12 PROCEDURE — G0439 PPPS, SUBSEQ VISIT: HCPCS | Performed by: FAMILY MEDICINE

## 2024-03-12 PROCEDURE — G8427 DOCREV CUR MEDS BY ELIG CLIN: HCPCS | Performed by: FAMILY MEDICINE

## 2024-03-12 RX ORDER — CEFDINIR 300 MG/1
300 CAPSULE ORAL 2 TIMES DAILY
Qty: 14 CAPSULE | Refills: 0 | Status: SHIPPED | OUTPATIENT
Start: 2024-03-12 | End: 2024-03-19

## 2024-03-12 SDOH — ECONOMIC STABILITY: FOOD INSECURITY: WITHIN THE PAST 12 MONTHS, YOU WORRIED THAT YOUR FOOD WOULD RUN OUT BEFORE YOU GOT MONEY TO BUY MORE.: NEVER TRUE

## 2024-03-12 SDOH — ECONOMIC STABILITY: FOOD INSECURITY: WITHIN THE PAST 12 MONTHS, THE FOOD YOU BOUGHT JUST DIDN'T LAST AND YOU DIDN'T HAVE MONEY TO GET MORE.: NEVER TRUE

## 2024-03-12 SDOH — ECONOMIC STABILITY: HOUSING INSECURITY
IN THE LAST 12 MONTHS, WAS THERE A TIME WHEN YOU DID NOT HAVE A STEADY PLACE TO SLEEP OR SLEPT IN A SHELTER (INCLUDING NOW)?: NO

## 2024-03-12 SDOH — ECONOMIC STABILITY: INCOME INSECURITY: HOW HARD IS IT FOR YOU TO PAY FOR THE VERY BASICS LIKE FOOD, HOUSING, MEDICAL CARE, AND HEATING?: NOT HARD AT ALL

## 2024-03-12 ASSESSMENT — ENCOUNTER SYMPTOMS
COUGH: 1
SINUS PRESSURE: 1
SINUS PAIN: 1
SHORTNESS OF BREATH: 0
WHEEZING: 0
GASTROINTESTINAL NEGATIVE: 1

## 2024-03-12 ASSESSMENT — PATIENT HEALTH QUESTIONNAIRE - PHQ9
SUM OF ALL RESPONSES TO PHQ QUESTIONS 1-9: 0
2. FEELING DOWN, DEPRESSED OR HOPELESS: 0
SUM OF ALL RESPONSES TO PHQ9 QUESTIONS 1 & 2: 0
SUM OF ALL RESPONSES TO PHQ QUESTIONS 1-9: 0
SUM OF ALL RESPONSES TO PHQ QUESTIONS 1-9: 0
1. LITTLE INTEREST OR PLEASURE IN DOING THINGS: 0
SUM OF ALL RESPONSES TO PHQ QUESTIONS 1-9: 0

## 2024-03-12 ASSESSMENT — LIFESTYLE VARIABLES
HOW MANY STANDARD DRINKS CONTAINING ALCOHOL DO YOU HAVE ON A TYPICAL DAY: PATIENT DOES NOT DRINK
HOW OFTEN DO YOU HAVE A DRINK CONTAINING ALCOHOL: NEVER

## 2024-03-12 NOTE — PATIENT INSTRUCTIONS
breads, oatmeal, beans, brown rice, citrus fruits, and apples.     Eat lean proteins. Heart-healthy proteins include seafood, lean meats and poultry, eggs, beans, peas, nuts, seeds, and soy products.     Limit drinks and foods with added sugar. These include candy, desserts, and soda pop.   Heart-healthy lifestyle    If your doctor recommends it, get more exercise. For many people, walking is a good choice. Or you may want to swim, bike, or do other activities. Bit by bit, increase the time you're active every day. Try for at least 30 minutes on most days of the week.     Try to quit or cut back on using tobacco and other nicotine products. This includes smoking and vaping. If you need help quitting, talk to your doctor about stop-smoking programs and medicines. These can increase your chances of quitting for good. Quitting is one of the most important things you can do to protect your heart. It is never too late to quit. Try to avoid secondhand smoke too.     Stay at a weight that's healthy for you. Talk to your doctor if you need help losing weight.     Try to get 7 to 9 hours of sleep each night.     Limit alcohol to 2 drinks a day for men and 1 drink a day for women. Too much alcohol can cause health problems.     Manage other health problems such as diabetes, high blood pressure, and high cholesterol. If you think you may have a problem with alcohol or drug use, talk to your doctor.   Medicines    Take your medicines exactly as prescribed. Call your doctor if you think you are having a problem with your medicine.     If your doctor recommends aspirin, take the amount directed each day. Make sure you take aspirin and not another kind of pain reliever, such as acetaminophen (Tylenol).   When should you call for help?   Call 911 if you have symptoms of a heart attack. These may include:    Chest pain or pressure, or a strange feeling in the chest.     Sweating.     Shortness of breath.     Pain, pressure, or a

## 2024-03-12 NOTE — PROGRESS NOTES
10:49 AM    HDL 48 12/22/2021 10:25 AM    LDLCALC 75 10/25/2023 10:18 AM    LDLCALC 102 03/10/2022 10:49 AM    LDLCALC 54 12/22/2021 10:25 AM    GLUCOSE 105 10/25/2023 10:18 AM    GLUCOSE 106 03/03/2015 10:00 AM    LABA1C 5.8 12/22/2021 10:25 AM    LABA1C 5.5 10/30/2020 12:07 PM    LABA1C 5.4 11/03/2017 07:32 AM       The ASCVD Risk score (Dann HIGUERA, et al., 2019) failed to calculate for the following reasons:    The 2019 ASCVD risk score is only valid for ages 40 to 79    Immunization History   Administered Date(s) Administered    COVID-19, MODERNA BLUE border, Primary or Immunocompromised, (age 12y+), IM, 100 mcg/0.5mL 01/20/2021, 02/26/2021    COVID-19, [place] Vaccine, Vaccine Unspecified 01/12/2022    Influenza Vaccine, unspecified formulation 11/23/2015    Influenza, AFLURIA (age 3 yrs+), FLUZONE, (age 6 mo+), MDV, 0.5mL 11/29/2016, 09/13/2017    Influenza, FLUAD, (age 65 y+), Adjuvanted, 0.5mL 10/12/2020, 12/21/2021    Influenza, High Dose (Fluzone 65 yrs and older) 10/08/2018    Influenza, Triv, inactivated, subunit, adjuvanted, IM (Fluad 65 yrs and older) 10/02/2019    Pneumococcal, PCV-13, PREVNAR 13, (age 6w+), IM, 0.5mL 09/11/2015    Pneumococcal, PPSV23, PNEUMOVAX 23, (age 2y+), SC/IM, 0.5mL 02/12/2018    Td, unspecified formulation 03/29/2017       Health Maintenance   Topic Date Due    Shingles vaccine (1 of 2) Never done    Respiratory Syncytial Virus (RSV) Pregnant or age 60 yrs+ (1 - 1-dose 60+ series) Never done    DTaP/Tdap/Td vaccine (1 - Tdap) 03/30/2017    Flu vaccine (1) 08/01/2023    COVID-19 Vaccine (4 - 2023-24 season) 09/01/2023    Depression Screen  09/14/2023    Lipids  10/25/2024    Prostate Specific Antigen (PSA) Screening or Monitoring  10/25/2024    Annual Wellness Visit (Medicare)  03/13/2025    Pneumococcal 65+ years Vaccine  Completed    Hepatitis A vaccine  Aged Out    Hepatitis B vaccine  Aged Out    Hib vaccine  Aged Out    Polio vaccine  Aged Out    Meningococcal (ACWY) vaccine

## 2024-03-29 DIAGNOSIS — I10 ESSENTIAL HYPERTENSION: ICD-10-CM

## 2024-03-29 RX ORDER — IRBESARTAN 300 MG/1
TABLET ORAL
Qty: 90 TABLET | Refills: 3 | Status: SHIPPED | OUTPATIENT
Start: 2024-03-29

## 2024-05-01 ENCOUNTER — HOSPITAL ENCOUNTER (OUTPATIENT)
Age: 89
Discharge: HOME | End: 2024-05-01
Payer: MEDICARE

## 2024-05-01 LAB — PSA SERPL-MCNC: < 0.02 NG/ML (ref 0–1)

## 2024-05-01 PROCEDURE — 36415 COLL VENOUS BLD VENIPUNCTURE: CPT

## 2024-05-01 PROCEDURE — 84153 ASSAY OF PSA TOTAL: CPT

## 2024-06-06 ENCOUNTER — OFFICE VISIT (OUTPATIENT)
Dept: FAMILY MEDICINE CLINIC | Age: 89
End: 2024-06-06

## 2024-06-06 VITALS
HEIGHT: 70 IN | WEIGHT: 152 LBS | BODY MASS INDEX: 21.76 KG/M2 | DIASTOLIC BLOOD PRESSURE: 62 MMHG | SYSTOLIC BLOOD PRESSURE: 112 MMHG | RESPIRATION RATE: 14 BRPM | OXYGEN SATURATION: 98 % | TEMPERATURE: 98.4 F | HEART RATE: 83 BPM

## 2024-06-06 DIAGNOSIS — J01.90 ACUTE RHINOSINUSITIS: Primary | ICD-10-CM

## 2024-06-06 RX ORDER — CEFDINIR 300 MG/1
300 CAPSULE ORAL 2 TIMES DAILY
Qty: 14 CAPSULE | Refills: 0 | Status: SHIPPED | OUTPATIENT
Start: 2024-06-06 | End: 2024-06-13

## 2024-06-06 ASSESSMENT — ENCOUNTER SYMPTOMS
SHORTNESS OF BREATH: 0
COUGH: 1
SINUS PRESSURE: 1
NAUSEA: 0
RHINORRHEA: 1
ABDOMINAL PAIN: 0

## 2024-06-06 NOTE — PROGRESS NOTES
Adolfo Bingham (2/8/1934) 89 y.o. male here for evaluation of the following chief complaint(s):      HPI:  Chief Complaint   Patient presents with    Cough     Scratchy throat , cough ,denies NVD ,taking nightquil to help with sleep   S/s started Monday        Onset of 4 days with ST, cough and congestion.  Coughing that productive.   PND.      Short term tightness in chest that has since resolved.     Denies CP, SOB or chest tightness      Vitals:    06/06/24 0820   BP: 112/62   Pulse: 83   Resp: 14   Temp: 98.4 °F (36.9 °C)   SpO2: 98%       Patient Active Problem List   Diagnosis    S/P colonoscopy    HTN (hypertension)    Hyperlipidemia with target LDL less than 100    History of prostate cancer    Elevated PSA    S/P prostatectomy    Adenocarcinoma of prostate ([facility])       SUBJECTIVE/OBJECTIVE:  Review of Systems   Constitutional:  Positive for activity change and fatigue. Negative for chills and fever.   HENT:  Positive for congestion, ear pain, postnasal drip, rhinorrhea and sinus pressure.    Respiratory:  Positive for cough. Negative for shortness of breath.    Cardiovascular:  Negative for chest pain.   Gastrointestinal:  Negative for abdominal pain and nausea.   Skin:  Negative for rash.   Neurological:  Positive for headaches. Negative for dizziness and light-headedness.   Psychiatric/Behavioral: Negative.         Physical Exam  Constitutional:       General: He is not in acute distress.  HENT:      Right Ear: A middle ear effusion is present.      Nose: Mucosal edema, congestion and rhinorrhea present.      Right Sinus: Maxillary sinus tenderness present.   Eyes:      Pupils: Pupils are equal, round, and reactive to light.   Cardiovascular:      Rate and Rhythm: Normal rate and regular rhythm.      Heart sounds: No murmur heard.  Pulmonary:      Effort: Pulmonary effort is normal.      Breath sounds: Normal breath sounds. No wheezing.   Abdominal:      General: Bowel sounds are normal. There is no

## 2024-07-03 DIAGNOSIS — I10 ESSENTIAL HYPERTENSION: ICD-10-CM

## 2024-07-03 DIAGNOSIS — E78.5 HYPERLIPIDEMIA WITH TARGET LDL LESS THAN 100: ICD-10-CM

## 2024-07-03 RX ORDER — BENAZEPRIL HYDROCHLORIDE 10 MG/1
TABLET ORAL
Qty: 90 TABLET | Refills: 3 | Status: SHIPPED | OUTPATIENT
Start: 2024-07-03

## 2024-07-03 RX ORDER — SIMVASTATIN 20 MG
TABLET ORAL
Qty: 90 TABLET | Refills: 3 | Status: SHIPPED | OUTPATIENT
Start: 2024-07-03

## 2024-09-19 DIAGNOSIS — I10 ESSENTIAL HYPERTENSION: ICD-10-CM

## 2024-09-19 RX ORDER — AMLODIPINE BESYLATE 5 MG/1
TABLET ORAL
Qty: 90 TABLET | Refills: 3 | Status: SHIPPED | OUTPATIENT
Start: 2024-09-19

## 2024-10-04 ENCOUNTER — TELEPHONE (OUTPATIENT)
Dept: CARDIOLOGY CLINIC | Age: 89
End: 2024-10-04

## 2024-10-04 DIAGNOSIS — E78.5 DYSLIPIDEMIA (HIGH LDL; LOW HDL): Primary | ICD-10-CM

## 2024-10-04 DIAGNOSIS — I10 PRIMARY HYPERTENSION: ICD-10-CM

## 2024-10-04 NOTE — TELEPHONE ENCOUNTER
Patient is scheduled for New Patient appt with Dr. Sim on 11/05/24. He is a previous [facility] pt.    [facility] normally orders bloodwork for pt to get prior to the appt.    He had ordered a Hepatic, Lipid, BMP, and CBC, but the orders from [facility] can't be used.    Could these be reordered for pt to get prior to appt. Pt would like a call back.

## 2024-10-29 ENCOUNTER — HOSPITAL ENCOUNTER (OUTPATIENT)
Age: 89
Discharge: HOME | End: 2024-10-29
Payer: MEDICARE

## 2024-10-29 DIAGNOSIS — I10 PRIMARY HYPERTENSION: ICD-10-CM

## 2024-10-29 DIAGNOSIS — E78.5 DYSLIPIDEMIA (HIGH LDL; LOW HDL): ICD-10-CM

## 2024-10-29 LAB
ALBUMIN SERPL BCG-MCNC: 4 G/DL (ref 3.5–5.1)
ALP SERPL-CCNC: 105 U/L (ref 38–126)
ALT SERPL W/O P-5'-P-CCNC: 13 U/L (ref 11–66)
ANION GAP SERPL CALC-SCNC: 12 MEQ/L (ref 8–16)
AST SERPL-CCNC: 16 U/L (ref 5–40)
BILIRUB CONJ SERPL-MCNC: 0.3 MG/DL (ref 0.1–13.8)
BILIRUB SERPL-MCNC: 0.8 MG/DL (ref 0.3–1.2)
BUN SERPL-MCNC: 14 MG/DL (ref 7–22)
CALCIUM SERPL-MCNC: 8.8 MG/DL (ref 8.5–10.5)
CHLORIDE SERPL-SCNC: 101 MEQ/L (ref 98–111)
CHOLEST SERPL-MCNC: 141 MG/DL (ref 100–199)
CO2 SERPL-SCNC: 24 MEQ/L (ref 23–33)
CREAT SERPL-MCNC: 0.6 MG/DL (ref 0.4–1.2)
DEPRECATED RDW RBC AUTO: 45 FL (ref 35–45)
ERYTHROCYTE [DISTWIDTH] IN BLOOD BY AUTOMATED COUNT: 13.2 % (ref 11.5–14.5)
GFR SERPL CREATININE-BSD FRML MDRD: > 90 ML/MIN/1.73M2
GLUCOSE SERPL-MCNC: 108 MG/DL (ref 70–108)
HCT VFR BLD AUTO: 48.3 % (ref 42–52)
HDLC SERPL-MCNC: 52 MG/DL
HGB BLD-MCNC: 15.5 GM/DL (ref 14–18)
LDLC SERPL CALC-MCNC: 73 MG/DL
MCH RBC QN AUTO: 29.7 PG (ref 26–33)
MCHC RBC AUTO-ENTMCNC: 32.1 GM/DL (ref 32.2–35.5)
MCV RBC AUTO: 92.5 FL (ref 80–94)
PLATELET # BLD AUTO: 245 THOU/MM3 (ref 130–400)
PMV BLD AUTO: 10.6 FL (ref 9.4–12.4)
POTASSIUM SERPL-SCNC: 4.5 MEQ/L (ref 3.5–5.2)
PROT SERPL-MCNC: 6.4 G/DL (ref 6.1–8)
RBC # BLD AUTO: 5.22 MILL/MM3 (ref 4.7–6.1)
SODIUM SERPL-SCNC: 137 MEQ/L (ref 135–145)
TRIGL SERPL-MCNC: 80 MG/DL (ref 0–199)
WBC # BLD AUTO: 7.1 THOU/MM3 (ref 4.8–10.8)

## 2024-10-29 PROCEDURE — 84153 ASSAY OF PSA TOTAL: CPT

## 2024-10-29 PROCEDURE — 85027 COMPLETE CBC AUTOMATED: CPT

## 2024-10-29 PROCEDURE — 82248 BILIRUBIN DIRECT: CPT

## 2024-10-29 PROCEDURE — 80061 LIPID PANEL: CPT

## 2024-10-29 PROCEDURE — 36415 COLL VENOUS BLD VENIPUNCTURE: CPT

## 2024-10-29 PROCEDURE — 80053 COMPREHEN METABOLIC PANEL: CPT

## 2024-10-30 LAB — PSA SERPL-MCNC: < 0.02 NG/ML (ref 0–1)

## 2024-11-05 ENCOUNTER — OFFICE VISIT (OUTPATIENT)
Dept: CARDIOLOGY CLINIC | Age: 89
End: 2024-11-05

## 2024-11-05 VITALS
DIASTOLIC BLOOD PRESSURE: 80 MMHG | BODY MASS INDEX: 22.05 KG/M2 | HEART RATE: 80 BPM | SYSTOLIC BLOOD PRESSURE: 136 MMHG | WEIGHT: 154 LBS | HEIGHT: 70 IN

## 2024-11-05 DIAGNOSIS — R06.02 SHORTNESS OF BREATH: ICD-10-CM

## 2024-11-05 DIAGNOSIS — E78.5 DYSLIPIDEMIA (HIGH LDL; LOW HDL): Primary | ICD-10-CM

## 2024-11-05 DIAGNOSIS — I10 PRIMARY HYPERTENSION: ICD-10-CM

## 2024-11-05 NOTE — PROGRESS NOTES
NP, formerly saw Dr. Nagy.   He denies having any chest pain, dizziness or palpitations.   He does have some shortness of breath with a lot of exertion.   He states no changes in his medications.       EKG completed.   
cardiac tests    EKG:    ECHO: No results found for this or any previous visit.      STRESS:    CATH:    Assessment/Plan       Diagnosis Orders   1. Dyslipidemia (high LDL; low HDL)  EKG 12 Lead      2. Primary hypertension  EKG 12 Lead      3. Shortness of breath  EKG 12 Lead        Establish care  HTN  HLD    No anginal or heart failure symptoms  Good exercise tolerance  EKG is SR, no ST-T changes  BP controlled  LDL 73  Continue Aspirin, statin, benazepril  Advised patient to call office or seek immediate medical attention if there is any new onset of  any chest pain, sob, palpitations, lightheadedness, dizziness, orthopnea, PND or pedal edema.   All medication side effects were discussed in details.    Thank youfor allowing me to participate in the care of this patient.   Please do not hesitate to contact me for any further questions.     Return in about 1 year (around 11/5/2025), or if symptoms worsen or fail to improve, for Review testing, Regular follow up.       Electronically signed by Mj Sim MD [facility]  11/5/2024 at 11:44 AM EST

## 2024-11-26 ENCOUNTER — OFFICE VISIT (OUTPATIENT)
Dept: FAMILY MEDICINE CLINIC | Age: 88
End: 2024-11-26

## 2024-11-26 VITALS
BODY MASS INDEX: 21.85 KG/M2 | WEIGHT: 152.6 LBS | RESPIRATION RATE: 20 BRPM | HEART RATE: 76 BPM | HEIGHT: 70 IN | SYSTOLIC BLOOD PRESSURE: 138 MMHG | DIASTOLIC BLOOD PRESSURE: 70 MMHG

## 2024-11-26 DIAGNOSIS — J01.90 ACUTE RHINOSINUSITIS: Primary | ICD-10-CM

## 2024-11-26 RX ORDER — CEFDINIR 300 MG/1
300 CAPSULE ORAL 2 TIMES DAILY
Qty: 14 CAPSULE | Refills: 0 | Status: SHIPPED | OUTPATIENT
Start: 2024-11-26 | End: 2024-12-03

## 2024-11-26 ASSESSMENT — ENCOUNTER SYMPTOMS
SINUS PAIN: 1
SINUS PRESSURE: 1
RHINORRHEA: 1
RESPIRATORY NEGATIVE: 1
GASTROINTESTINAL NEGATIVE: 1

## 2024-11-26 NOTE — PROGRESS NOTES
Adolfo Bingham (:  1934) is a 89 y.o. male,Established patient, here for evaluation of the following chief complaint(s):  Sinusitis          Subjective   SUBJECTIVE/OBJECTIVE:  HPI  Chief Complaint   Patient presents with    Sinusitis     Pt presents today with c/o sinus pressure, congestion, pressure for the last 2 weeks.  OTCs with no relief.    A lot of pressure and phlegm.      No fevers.    Patient Active Problem List   Diagnosis    S/P colonoscopy    HTN (hypertension)    Hyperlipidemia with target LDL less than 100    History of prostate cancer    Elevated PSA    S/P prostatectomy    Adenocarcinoma of prostate (HCC)       Current Outpatient Medications   Medication Sig Dispense Refill    cefdinir (OMNICEF) 300 MG capsule Take 1 capsule by mouth 2 times daily for 7 days 14 capsule 0    amLODIPine (NORVASC) 5 MG tablet TAKE 1 TABLET BY MOUTH EVERY DAY 90 tablet 3    benazepril (LOTENSIN) 10 MG tablet TAKE 1 TABLET BY MOUTH EVERY DAY 90 tablet 3    simvastatin (ZOCOR) 20 MG tablet TAKE 1 TABLET BY MOUTH EVERY DAY AT NIGHT 90 tablet 3    irbesartan (AVAPRO) 300 MG tablet TAKE 1 TABLET BY MOUTH EVERY DAY 90 tablet 3    CRANBERRY PO Take by mouth daily      ipratropium (ATROVENT) 0.03 % nasal spray SPRAY 2 SPRAYS BY NASAL ROUTE EVERY 12 HOURS 1 Bottle 1    aspirin 81 MG tablet Take 1 tablet by mouth daily      Multiple Vitamins-Minerals (MULTIVITAMIN PO) Take 1 tablet by mouth daily.      b complex vitamins capsule Take 1 capsule by mouth daily      Omega 3 1000 MG CAPS Take 1 tablet by mouth daily.      Vitamin D (CHOLECALCIFEROL) 1000 UNITS CAPS capsule Take 1 capsule by mouth daily       No current facility-administered medications for this visit.       Past Surgical History:   Procedure Laterality Date    COLONOSCOPY  13    Dr. Spring     INGUINAL HERNIA REPAIR Right      INGUINAL HERNIA REPAIR Left      INGUINAL HERNIA REPAIR Right      AZ COLON CA SCRN NOT HI RSK IND Left

## 2024-12-31 ENCOUNTER — OFFICE VISIT (OUTPATIENT)
Dept: FAMILY MEDICINE CLINIC | Age: 88
End: 2024-12-31

## 2024-12-31 VITALS
RESPIRATION RATE: 16 BRPM | TEMPERATURE: 98 F | SYSTOLIC BLOOD PRESSURE: 142 MMHG | WEIGHT: 155.5 LBS | BODY MASS INDEX: 22.31 KG/M2 | HEART RATE: 92 BPM | DIASTOLIC BLOOD PRESSURE: 88 MMHG

## 2024-12-31 DIAGNOSIS — J06.9 VIRAL URI: ICD-10-CM

## 2024-12-31 DIAGNOSIS — R22.1 LOCALIZED SWELLING, MASS AND LUMP, NECK: Primary | ICD-10-CM

## 2024-12-31 RX ORDER — CEFDINIR 300 MG/1
300 CAPSULE ORAL 2 TIMES DAILY
Qty: 14 CAPSULE | Refills: 0 | Status: SHIPPED | OUTPATIENT
Start: 2024-12-31 | End: 2025-01-07

## 2024-12-31 ASSESSMENT — ENCOUNTER SYMPTOMS
SHORTNESS OF BREATH: 0
NAUSEA: 0
ABDOMINAL PAIN: 0
COUGH: 0
RHINORRHEA: 1

## 2024-12-31 NOTE — PROGRESS NOTES
Adolfo Bingham (2/8/1934) 89 y.o. male here for evaluation of the following chief complaint(s):      HPI:  Chief Complaint   Patient presents with    Adenopathy     Swollen gland on the left side neck the past week, not going away. No change in size. Sometimes pt has pain with swallowing. No pain with touching the gland    Ear Pain     Pt has some left ear pain, has let up some. Still wants it looked at       Onset of 1 week with left side neck feeling swollen.  Increase in size.  Occasional pain with swelling.  PND.  Runny nose.   Left ear has pressure at times.   Denies pain to swelling.  Dry mouth.     Vitals:    12/31/24 1406   BP: (!) 142/88   Pulse:    Resp:    Temp:        Patient Active Problem List   Diagnosis    S/P colonoscopy    HTN (hypertension)    Hyperlipidemia with target LDL less than 100    History of prostate cancer    Elevated PSA    S/P prostatectomy    Adenocarcinoma of prostate (HCC)       SUBJECTIVE/OBJECTIVE:  Review of Systems   Constitutional:  Negative for chills and fever.   HENT:  Positive for congestion, postnasal drip and rhinorrhea.    Respiratory:  Negative for cough and shortness of breath.    Cardiovascular:  Negative for chest pain.   Gastrointestinal:  Negative for abdominal pain and nausea.   Skin:  Negative for rash.   Neurological:  Negative for dizziness, light-headedness and headaches.   Hematological:  Positive for adenopathy.   Psychiatric/Behavioral: Negative.         Physical Exam  Constitutional:       General: He is not in acute distress.  Eyes:      Pupils: Pupils are equal, round, and reactive to light.   Cardiovascular:      Rate and Rhythm: Normal rate and regular rhythm.      Heart sounds: No murmur heard.  Pulmonary:      Effort: Pulmonary effort is normal.      Breath sounds: Normal breath sounds. No wheezing.   Abdominal:      General: Bowel sounds are normal. There is no distension.      Palpations: Abdomen is soft.      Tenderness: There is no abdominal

## 2025-04-24 ENCOUNTER — TELEPHONE (OUTPATIENT)
Dept: FAMILY MEDICINE CLINIC | Age: 89
End: 2025-04-24

## 2025-05-13 ENCOUNTER — HOSPITAL ENCOUNTER (OUTPATIENT)
Age: 89
Discharge: HOME | End: 2025-05-13
Payer: MEDICARE

## 2025-05-13 LAB — PSA SERPL-MCNC: < 0.02 NG/ML (ref 0–1)

## 2025-05-13 PROCEDURE — 84153 ASSAY OF PSA TOTAL: CPT

## 2025-05-13 PROCEDURE — 36415 COLL VENOUS BLD VENIPUNCTURE: CPT

## 2025-05-20 ENCOUNTER — OFFICE VISIT (OUTPATIENT)
Dept: FAMILY MEDICINE CLINIC | Age: 89
End: 2025-05-20

## 2025-05-20 VITALS
DIASTOLIC BLOOD PRESSURE: 76 MMHG | SYSTOLIC BLOOD PRESSURE: 138 MMHG | BODY MASS INDEX: 22.51 KG/M2 | WEIGHT: 156.9 LBS | RESPIRATION RATE: 16 BRPM | HEART RATE: 96 BPM

## 2025-05-20 DIAGNOSIS — I10 ESSENTIAL HYPERTENSION: ICD-10-CM

## 2025-05-20 DIAGNOSIS — R73.01 IFG (IMPAIRED FASTING GLUCOSE): ICD-10-CM

## 2025-05-20 DIAGNOSIS — C61 ADENOCARCINOMA OF PROSTATE (HCC): ICD-10-CM

## 2025-05-20 DIAGNOSIS — Z90.79 S/P PROSTATECTOMY: ICD-10-CM

## 2025-05-20 DIAGNOSIS — Z00.00 MEDICARE ANNUAL WELLNESS VISIT, SUBSEQUENT: Primary | ICD-10-CM

## 2025-05-20 DIAGNOSIS — I10 HYPERTENSION, UNSPECIFIED TYPE: ICD-10-CM

## 2025-05-20 DIAGNOSIS — E78.5 HYPERLIPIDEMIA WITH TARGET LDL LESS THAN 100: ICD-10-CM

## 2025-05-20 DIAGNOSIS — Z85.46 HISTORY OF PROSTATE CANCER: ICD-10-CM

## 2025-05-20 SDOH — ECONOMIC STABILITY: FOOD INSECURITY: WITHIN THE PAST 12 MONTHS, THE FOOD YOU BOUGHT JUST DIDN'T LAST AND YOU DIDN'T HAVE MONEY TO GET MORE.: NEVER TRUE

## 2025-05-20 SDOH — ECONOMIC STABILITY: FOOD INSECURITY: WITHIN THE PAST 12 MONTHS, YOU WORRIED THAT YOUR FOOD WOULD RUN OUT BEFORE YOU GOT MONEY TO BUY MORE.: NEVER TRUE

## 2025-05-20 ASSESSMENT — PATIENT HEALTH QUESTIONNAIRE - PHQ9
SUM OF ALL RESPONSES TO PHQ QUESTIONS 1-9: 0
2. FEELING DOWN, DEPRESSED OR HOPELESS: NOT AT ALL
SUM OF ALL RESPONSES TO PHQ QUESTIONS 1-9: 0
1. LITTLE INTEREST OR PLEASURE IN DOING THINGS: NOT AT ALL

## 2025-05-20 ASSESSMENT — ENCOUNTER SYMPTOMS
RESPIRATORY NEGATIVE: 1
GASTROINTESTINAL NEGATIVE: 1

## 2025-05-20 NOTE — PROGRESS NOTES
Consulting Physician (Cardiology)  Kennedy Painter MD (Radiation Oncology)     Recommendations for Preventive Services Due: see orders and patient instructions/AVS.  Recommended screening schedule for the next 5-10 years is provided to the patient in written form: see Patient Instructions/AVS.     Reviewed and updated this visit:  Tobacco  Allergies  Meds  Med Hx  Sexual Hx

## 2025-05-20 NOTE — PATIENT INSTRUCTIONS
cholesterol. If you think you may have a problem with alcohol or drug use, talk to your doctor.   Medicines    Take your medicines exactly as prescribed. Call your doctor if you think you are having a problem with your medicine.     If your doctor recommends aspirin, take the amount directed each day. Make sure you take aspirin and not another kind of pain reliever, such as acetaminophen (Tylenol).   When should you call for help?   Call 911 if you have symptoms of a heart attack. These may include:    Chest pain or pressure, or a strange feeling in the chest.     Sweating.     Shortness of breath.     Pain, pressure, or a strange feeling in the back, neck, jaw, or upper belly or in one or both shoulders or arms.     Lightheadedness or sudden weakness.     A fast or irregular heartbeat.   After you call 911, the  may tell you to chew 1 adult-strength or 2 to 4 low-dose aspirin. Wait for an ambulance. Do not try to drive yourself.  Watch closely for changes in your health, and be sure to contact your doctor if you have any problems.  Where can you learn more?  Go to https://www.[employer].net/patientEd and enter F075 to learn more about \"A Healthy Heart: Care Instructions.\"  Current as of: July 31, 2024  Content Version: 14.4  © 7980-9157 [employer].   Care instructions adapted under license by [employer]. If you have questions about a medical condition or this instruction, always ask your healthcare professional. [employer], disclaims any warranty or liability for your use of this information.    Personalized Preventive Plan for Adolfo Bingham - 5/20/2025  Medicare offers a range of preventive health benefits. Some of the tests and screenings are paid in full while other may be subject to a deductible, co-insurance, and/or copay.  Some of these benefits include a comprehensive review of your medical history including lifestyle, illnesses that may run in your family, and various

## 2025-06-04 DIAGNOSIS — I10 ESSENTIAL HYPERTENSION: ICD-10-CM

## 2025-06-04 RX ORDER — IRBESARTAN 300 MG/1
300 TABLET ORAL DAILY
Qty: 90 TABLET | Refills: 3 | Status: SHIPPED | OUTPATIENT
Start: 2025-06-04

## 2025-07-02 DIAGNOSIS — E78.5 HYPERLIPIDEMIA WITH TARGET LDL LESS THAN 100: ICD-10-CM

## 2025-07-02 DIAGNOSIS — I10 ESSENTIAL HYPERTENSION: ICD-10-CM

## 2025-07-02 RX ORDER — BENAZEPRIL HYDROCHLORIDE 10 MG/1
10 TABLET ORAL DAILY
Qty: 90 TABLET | Refills: 3 | Status: SHIPPED | OUTPATIENT
Start: 2025-07-02

## 2025-07-02 RX ORDER — SIMVASTATIN 20 MG
20 TABLET ORAL NIGHTLY
Qty: 90 TABLET | Refills: 3 | Status: SHIPPED | OUTPATIENT
Start: 2025-07-02

## (undated) DEVICE — CONNECTOR TBNG AUX H2O JET DISP FOR OLY 160/180 SER

## (undated) DEVICE — SET LNR RED GRN W/ BASE CLEANASCOPE

## (undated) DEVICE — FORCEPS BX L240CM JAW DIA3.2MM L CAP W/ NDL MIC MESH TOOTH

## (undated) DEVICE — FORCEPS BX L L240CM DIA2.4MM RAD JAW 4 HOT FOR POLYP DISP

## (undated) DEVICE — SOLUTION IV 1000ML 0.45% SOD CHL PH 5 INJ USP VIAFLX PLAS

## (undated) DEVICE — PATIENT RETURN ELECTRODE, SINGLE-USE, CONTACT QUALITY MONITORING, ADULT, WITH 9FT CORD, FOR PATIENTS WEIGING OVER 33LBS. (15KG): Brand: MEGADYNE

## (undated) DEVICE — CATHETER ETER IV 22GA L1IN POLYUR STR RADPQ INTROCAN SFTY

## (undated) DEVICE — SNARE POLYP SM W13MMXL240CM SHTH DIA2.4MM OVL FLX DISP

## (undated) DEVICE — IV START KIT: Brand: MEDLINE INDUSTRIES, INC.

## (undated) DEVICE — TRAP POLYP ETRAP

## (undated) DEVICE — ENDO KIT: Brand: MEDLINE INDUSTRIES, INC.

## (undated) DEVICE — GLOVE ORANGE PI 8   MSG9080

## (undated) DEVICE — SET ADMIN 25ML L117IN PMP MOD CK VLV RLER CLMP 2 SMRTSITE